# Patient Record
Sex: MALE | Race: ASIAN | NOT HISPANIC OR LATINO
[De-identification: names, ages, dates, MRNs, and addresses within clinical notes are randomized per-mention and may not be internally consistent; named-entity substitution may affect disease eponyms.]

---

## 2023-12-29 VITALS
DIASTOLIC BLOOD PRESSURE: 63 MMHG | OXYGEN SATURATION: 96 % | HEART RATE: 65 BPM | RESPIRATION RATE: 16 BRPM | SYSTOLIC BLOOD PRESSURE: 104 MMHG | HEIGHT: 63 IN | WEIGHT: 130.07 LBS | TEMPERATURE: 97 F

## 2023-12-29 RX ORDER — SODIUM CHLORIDE 9 MG/ML
500 INJECTION INTRAMUSCULAR; INTRAVENOUS; SUBCUTANEOUS
Refills: 0 | Status: DISCONTINUED | OUTPATIENT
Start: 2024-01-08 | End: 2024-01-09

## 2023-12-29 RX ORDER — CHLORHEXIDINE GLUCONATE 213 G/1000ML
1 SOLUTION TOPICAL ONCE
Refills: 0 | Status: DISCONTINUED | OUTPATIENT
Start: 2024-01-08 | End: 2024-01-09

## 2023-12-29 RX ORDER — SIMVASTATIN 20 MG/1
1 TABLET, FILM COATED ORAL
Refills: 0 | DISCHARGE

## 2023-12-29 NOTE — H&P ADULT - ASSESSMENT
54 yo Mandarin speaking male with a PMH of HTN, HLD, DM2,  AFIB (previously on Eliquis, self stopped and started ASA 12/24 per patient) who presented to outpt cardiologist Dr. Reza (referred by PCP Dr. Peterson), for TURNER and cardiomegaly. Patient reports TURNER + SSCP upon walking on an incline. As per MD note, patient was found to have cardiomegaly on CXR. TTE 10/16/23:  normal LVSF EF 55-60%, mild concentric LV hypertrophy, LA mildly dilated, aortic root mildly dilated, no significant changes compared to TTE on 11/16/22. CCTA 12/6/23: Ca score 364, subtotal occlusion in dRCA, severe stenosis in prox + mid LCx and mRCA, mod-severe stenosis mLAD, remaining coronary segments non-obstructive, normal LV function EF 63%. In light of patient's risk factors, abnormal CCTA results, and CCS class III anginal/anginal-equivalent symptoms, patient is referred to [facility] for cardiac catheterization w/ possible intervention if clinically indicated.    -H/H = 13.5/38.5. Pt denies BRBPR, hematuria, hematochezia, melena. Discussed load with Dr. Reza given extensive CAD on CCTA. Per Dr. Reza, pt loaded 325mg ASA x1 and Plavix 600mg x1 pre cath.  #[id number] (Mandarin).   -Of note: Patient stated that after visit with Dr. Reza on 12/14 he stopped taking Eliquis and started the new ASA that was prescribed. He has been compliant with ASA and last dose x2d ago. Pt  made aware he should continue Eliquis and medications would be clarified post cath  -Mg 1.9 s/p 400mg Mag Ox x1  -Cr = 0.86. EF normal. Euvolemic on exam. IVF with 250cc bolus x1 followed by NS @ 75cc/hr started pre procedure per protocol  -Type of sedation: moderate  -Candidate for sedation: yes     Risks & benefits of procedure and alternative therapy have been explained to the patient including but not limited to: allergic reaction, bleeding w/possible need for blood transfusion, infection, renal and vascular compromise, limb damage, arrhythmia, stroke, vessel dissection/perforation, Myocardial infarction, emergent CABG. Informed consent obtained and in chart.  52 yo Mandarin speaking male with a PMH of HTN, HLD, DM2,  AFIB (previously on Eliquis, self stopped and started ASA 12/24 per patient) who presented to outpt cardiologist Dr. Reza (referred by PCP Dr. Peterson), for TURNER and cardiomegaly. Patient reports TURNER + SSCP upon walking on an incline. As per MD note, patient was found to have cardiomegaly on CXR. TTE 10/16/23:  normal LVSF EF 55-60%, mild concentric LV hypertrophy, LA mildly dilated, aortic root mildly dilated, no significant changes compared to TTE on 11/16/22. CCTA 12/6/23: Ca score 364, subtotal occlusion in dRCA, severe stenosis in prox + mid LCx and mRCA, mod-severe stenosis mLAD, remaining coronary segments non-obstructive, normal LV function EF 63%. In light of patient's risk factors, abnormal CCTA results, and CCS class III anginal/anginal-equivalent symptoms, patient is referred to [facility] for cardiac catheterization w/ possible intervention if clinically indicated.    -H/H = 13.5/38.5. Pt denies BRBPR, hematuria, hematochezia, melena. Discussed load with Dr. Reza given extensive CAD on CCTA. Per Dr. Reza, pt loaded 325mg ASA x1 and Plavix 600mg x1 pre cath.  #[id number] (Mandarin).   -Of note: Patient stated that after visit with Dr. Reza on 12/14 he stopped taking Eliquis and started the new ASA that was prescribed. He has been compliant with ASA and last dose x2d ago. Pt  made aware he should continue Eliquis and medications would be clarified post cath  -Mg 1.9 s/p 400mg Mag Ox x1  -Cr = 0.86. EF normal. Euvolemic on exam. IVF with 250cc bolus x1 followed by NS @ 75cc/hr started pre procedure per protocol  -Type of sedation: moderate  -Candidate for sedation: yes     Risks & benefits of procedure and alternative therapy have been explained to the patient including but not limited to: allergic reaction, bleeding w/possible need for blood transfusion, infection, renal and vascular compromise, limb damage, arrhythmia, stroke, vessel dissection/perforation, Myocardial infarction, emergent CABG. Informed consent obtained and in chart.

## 2023-12-29 NOTE — H&P ADULT - HISTORY OF PRESENT ILLNESS
Cards: Dr. Reza   Pharm:   Escort:       **CCTA SHOWS POSSIBLE 3 vessel-- discussed with Fellow about DAPT****     53 YOF pmhx of HTN, HLD, DM2,  AFIB (on Eliquis last dose______), hyperthyroidism presents to outpt cardiologist Dr. Reza (referred by PCP Dr. Peterson), for TURNER and cardiomegaly. Patient reports TURNER + SSCP upon walking on an incline. As per MD note, patient was found to have cardiomegaly on CXR. Patient denies______ palpitations, dizziness, LOC, N/V/D, fever/chills/sick contact, diaphoresis, orthopnea/PND, and leg swelling.    TTE 10/16/23:  normal LVSF EF 55-60%, mild concentric LV hypertrophy, LA mildly dilated, aortic root mildly dilated, no significant changes compared to TTE on 11/16/22.   CCTA 12/6/23: Ca score 364, subtotal occlusion in dRCA, severe stenosis in prox + mid LCx and mRCA, mod-severe stenosis mLAD, remaining coronary segments non-obstructive, normal LV function EF 63%.     In light of patient's risk factors, abnormal CCTA results, and CCS class III anginal/anginal-equivalent symptoms, patient is referred to [facility] for cardiac catheterization w/ possible intervention if clinically indicated.       Cards: Dr. Reza   Pharm:   Escort:       **CCTA SHOWS POSSIBLE 3 vessel-- discussed with Fellow about DAPT****     53 YOF pmhx of HTN, HLD, DM2,  AFIB (on Eliquis last dose______), hyperthyroidism presents to outpt cardiologist Dr. Reza (referred by PCP Dr. Peterson), for TURNER and cardiomegaly. Patient reports TURNER + SSCP upon walking on an incline. As per MD note, patient was found to have cardiomegaly on CXR. Patient denies______ palpitations, dizziness, LOC, N/V/D, fever/chills/sick contact, diaphoresis, orthopnea/PND, and leg swelling.    TTE 10/16/23:  normal LVSF EF 55-60%, mild concentric LV hypertrophy, LA mildly dilated, aortic root mildly dilated, no significant changes compared to TTE on 11/16/22.   CCTA 12/6/23: Ca score 364, subtotal occlusion in dRCA, severe stenosis in prox + mid LCx and mRCA, mod-severe stenosis mLAD, remaining coronary segments non-obstructive, normal LV function EF 63%.     In light of patient's risk factors, abnormal CCTA results, and CCS class III anginal/anginal-equivalent symptoms, patient is referred to [facility] for cardiac catheterization w/ possible intervention if clinically indicated.       Cards: Dr. Reza   Pharm:   Escort:       **CCTA SHOWS POSSIBLE 3 vessel-- discussed with Fellow about DAPT****     52 YO Mandarin speaking male with pmhx of HTN, HLD, DM2,  AFIB (on Eliquis last dose______), hyperthyroidism presents to outpt cardiologist Dr. Reza (referred by PCP Dr. Peterson), for TURNER and cardiomegaly. Patient reports TURNER + SSCP upon walking on an incline. As per MD note, patient was found to have cardiomegaly on CXR. Patient denies______ palpitations, dizziness, LOC, N/V/D, fever/chills/sick contact, diaphoresis, orthopnea/PND, and leg swelling.    TTE 10/16/23:  normal LVSF EF 55-60%, mild concentric LV hypertrophy, LA mildly dilated, aortic root mildly dilated, no significant changes compared to TTE on 11/16/22.   CCTA 12/6/23: Ca score 364, subtotal occlusion in dRCA, severe stenosis in prox + mid LCx and mRCA, mod-severe stenosis mLAD, remaining coronary segments non-obstructive, normal LV function EF 63%.     In light of patient's risk factors, abnormal CCTA results, and CCS class III anginal/anginal-equivalent symptoms, patient is referred to [facility] for cardiac catheterization w/ possible intervention if clinically indicated.       Cards: Dr. Reza   Pharm:   Escort:       **CCTA SHOWS POSSIBLE 3 vessel-- discussed with Fellow about DAPT****     52 YO Mandarin speaking male with pmhx of HTN, HLD, DM2,  AFIB (on Eliquis last dose______), hyperthyroidism presents to outpt cardiologist Dr. Reza (referred by PCP Dr. Peterson), for TURNER and cardiomegaly. Patient reports TURNER + SSCP upon walking on an incline. As per MD note, patient was found to have cardiomegaly on CXR. Patient denies______ palpitations, dizziness, LOC, N/V/D, fever/chills/sick contact, diaphoresis, orthopnea/PND, and leg swelling.    TTE 10/16/23:  normal LVSF EF 55-60%, mild concentric LV hypertrophy, LA mildly dilated, aortic root mildly dilated, no significant changes compared to TTE on 11/16/22.   CCTA 12/6/23: Ca score 364, subtotal occlusion in dRCA, severe stenosis in prox + mid LCx and mRCA, mod-severe stenosis mLAD, remaining coronary segments non-obstructive, normal LV function EF 63%.     In light of patient's risk factors, abnormal CCTA results, and CCS class III anginal/anginal-equivalent symptoms, patient is referred to [facility] for cardiac catheterization w/ possible intervention if clinically indicated.       Cards: Dr. Reza   Pharm:   Escort:       **CCTA SHOWS POSSIBLE 3 vessel-- discuss with Fellow about DAPT****     54 YO Mandarin speaking male with pmhx of HTN, HLD, DM2,  AFIB (on Eliquis last dose______), hyperthyroidism presents to outpt cardiologist Dr. Reza (referred by PCP Dr. Peterson), for TURNER and cardiomegaly. Patient reports TURNER + SSCP upon walking on an incline. As per MD note, patient was found to have cardiomegaly on CXR. Patient denies______ palpitations, dizziness, LOC, N/V/D, fever/chills/sick contact, diaphoresis, orthopnea/PND, and leg swelling.    TTE 10/16/23:  normal LVSF EF 55-60%, mild concentric LV hypertrophy, LA mildly dilated, aortic root mildly dilated, no significant changes compared to TTE on 11/16/22.   CCTA 12/6/23: Ca score 364, subtotal occlusion in dRCA, severe stenosis in prox + mid LCx and mRCA, mod-severe stenosis mLAD, remaining coronary segments non-obstructive, normal LV function EF 63%.     In light of patient's risk factors, abnormal CCTA results, and CCS class III anginal/anginal-equivalent symptoms, patient is referred to [facility] for cardiac catheterization w/ possible intervention if clinically indicated.       Cards: Dr. Reza   Pharm:   Escort:       **CCTA SHOWS POSSIBLE 3 vessel-- discuss with Fellow about DAPT****     54 YO Mandarin speaking male with pmhx of HTN, HLD, DM2,  AFIB (on Eliquis last dose______), hyperthyroidism presents to outpt cardiologist Dr. Reza (referred by PCP Dr. Peterson), for TURNER and cardiomegaly. Patient reports TURNER + SSCP upon walking on an incline. As per MD note, patient was found to have cardiomegaly on CXR. Patient denies______ palpitations, dizziness, LOC, N/V/D, fever/chills/sick contact, diaphoresis, orthopnea/PND, and leg swelling.    TTE 10/16/23:  normal LVSF EF 55-60%, mild concentric LV hypertrophy, LA mildly dilated, aortic root mildly dilated, no significant changes compared to TTE on 11/16/22.   CCTA 12/6/23: Ca score 364, subtotal occlusion in dRCA, severe stenosis in prox + mid LCx and mRCA, mod-severe stenosis mLAD, remaining coronary segments non-obstructive, normal LV function EF 63%.     In light of patient's risk factors, abnormal CCTA results, and CCS class III anginal/anginal-equivalent symptoms, patient is referred to [facility] for cardiac catheterization w/ possible intervention if clinically indicated.       Cards: Dr. Reza   Pharm: [facility]  Escort: Wife    54 yo Mandarin speaking male with a PMH of HTN, HLD, DM2,  AFIB (previously on Eliquis, self stopped and started ASA 12/24 per patient) who presented to outpt cardiologist Dr. Reza (referred by PCP Dr. Peterson), for TURNER and cardiomegaly. Patient reports TURNER + SSCP upon walking on an incline. As per MD note, patient was found to have cardiomegaly on CXR. Patient denies palpitations, dizziness, LOC, N/V/D, diaphoresis, orthopnea/PND, and leg swelling. TTE 10/16/23:  normal LVSF EF 55-60%, mild concentric LV hypertrophy, LA mildly dilated, aortic root mildly dilated, no significant changes compared to TTE on 11/16/22. CCTA 12/6/23: Ca score 364, subtotal occlusion in dRCA, severe stenosis in prox + mid LCx and mRCA, mod-severe stenosis mLAD, remaining coronary segments non-obstructive, normal LV function EF 63%. In light of patient's risk factors, abnormal CCTA results, and CCS class III anginal/anginal-equivalent symptoms, patient is referred to [facility] for cardiac catheterization w/ possible intervention if clinically indicated.       Cards: Dr. Reza   Pharm: [email or web address]  Escort: Wife    54 yo Mandarin speaking male with a PMH of HTN, HLD, DM2,  AFIB (previously on Eliquis, self stopped and started ASA 12/24 per patient) who presented to outpt cardiologist Dr. Reza (referred by PCP Dr. Peterson), for TURNER and cardiomegaly. Patient reports TURNER + SSCP upon walking on an incline. As per MD note, patient was found to have cardiomegaly on CXR. Patient denies palpitations, dizziness, LOC, N/V/D, diaphoresis, orthopnea/PND, and leg swelling. TTE 10/16/23:  normal LVSF EF 55-60%, mild concentric LV hypertrophy, LA mildly dilated, aortic root mildly dilated, no significant changes compared to TTE on 11/16/22. CCTA 12/6/23: Ca score 364, subtotal occlusion in dRCA, severe stenosis in prox + mid LCx and mRCA, mod-severe stenosis mLAD, remaining coronary segments non-obstructive, normal LV function EF 63%. In light of patient's risk factors, abnormal CCTA results, and CCS class III anginal/anginal-equivalent symptoms, patient is referred to [facility] for cardiac catheterization w/ possible intervention if clinically indicated.

## 2023-12-29 NOTE — H&P ADULT - NSICDXPASTMEDICALHX_GEN_ALL_CORE_FT
PAST MEDICAL HISTORY:  HLD (hyperlipidemia)     HTN (hypertension)     Hyperthyroidism     Type 2 diabetes mellitus      PAST MEDICAL HISTORY:  HLD (hyperlipidemia)     HTN (hypertension)     Type 2 diabetes mellitus

## 2024-01-08 ENCOUNTER — TRANSCRIPTION ENCOUNTER (OUTPATIENT)
Age: 54
End: 2024-01-08

## 2024-01-08 ENCOUNTER — INPATIENT (INPATIENT)
Facility: HOSPITAL | Age: 54
LOS: 0 days | Discharge: HOME | DRG: 322 | End: 2024-01-09
Attending: INTERNAL MEDICINE | Admitting: INTERNAL MEDICINE
Payer: COMMERCIAL

## 2024-01-08 DIAGNOSIS — Z87.19 PERSONAL HISTORY OF OTHER DISEASES OF THE DIGESTIVE SYSTEM: Chronic | ICD-10-CM

## 2024-01-08 LAB
A1C WITH ESTIMATED AVERAGE GLUCOSE RESULT: 9 % — HIGH (ref 4–5.6)
A1C WITH ESTIMATED AVERAGE GLUCOSE RESULT: 9 % — HIGH (ref 4–5.6)
ALBUMIN SERPL ELPH-MCNC: 3.9 G/DL — SIGNIFICANT CHANGE UP (ref 3.3–5)
ALBUMIN SERPL ELPH-MCNC: 3.9 G/DL — SIGNIFICANT CHANGE UP (ref 3.3–5)
ALP SERPL-CCNC: 81 U/L — SIGNIFICANT CHANGE UP (ref 40–120)
ALP SERPL-CCNC: 81 U/L — SIGNIFICANT CHANGE UP (ref 40–120)
ALT FLD-CCNC: 48 U/L — HIGH (ref 10–45)
ALT FLD-CCNC: 48 U/L — HIGH (ref 10–45)
ANION GAP SERPL CALC-SCNC: 6 MMOL/L — SIGNIFICANT CHANGE UP (ref 5–17)
ANION GAP SERPL CALC-SCNC: 6 MMOL/L — SIGNIFICANT CHANGE UP (ref 5–17)
APTT BLD: 29.3 SEC — SIGNIFICANT CHANGE UP (ref 24.5–35.6)
APTT BLD: 29.3 SEC — SIGNIFICANT CHANGE UP (ref 24.5–35.6)
AST SERPL-CCNC: 36 U/L — SIGNIFICANT CHANGE UP (ref 10–40)
AST SERPL-CCNC: 36 U/L — SIGNIFICANT CHANGE UP (ref 10–40)
BASOPHILS # BLD AUTO: 0.03 K/UL — SIGNIFICANT CHANGE UP (ref 0–0.2)
BASOPHILS # BLD AUTO: 0.03 K/UL — SIGNIFICANT CHANGE UP (ref 0–0.2)
BASOPHILS NFR BLD AUTO: 0.6 % — SIGNIFICANT CHANGE UP (ref 0–2)
BASOPHILS NFR BLD AUTO: 0.6 % — SIGNIFICANT CHANGE UP (ref 0–2)
BILIRUB SERPL-MCNC: 1.2 MG/DL — SIGNIFICANT CHANGE UP (ref 0.2–1.2)
BILIRUB SERPL-MCNC: 1.2 MG/DL — SIGNIFICANT CHANGE UP (ref 0.2–1.2)
BUN SERPL-MCNC: 26 MG/DL — HIGH (ref 7–23)
BUN SERPL-MCNC: 26 MG/DL — HIGH (ref 7–23)
CALCIUM SERPL-MCNC: 9 MG/DL — SIGNIFICANT CHANGE UP (ref 8.4–10.5)
CALCIUM SERPL-MCNC: 9 MG/DL — SIGNIFICANT CHANGE UP (ref 8.4–10.5)
CHLORIDE SERPL-SCNC: 111 MMOL/L — HIGH (ref 96–108)
CHLORIDE SERPL-SCNC: 111 MMOL/L — HIGH (ref 96–108)
CHOLEST SERPL-MCNC: 101 MG/DL — SIGNIFICANT CHANGE UP
CHOLEST SERPL-MCNC: 101 MG/DL — SIGNIFICANT CHANGE UP
CK MB CFR SERPL CALC: 1.9 NG/ML — SIGNIFICANT CHANGE UP (ref 0–6.7)
CK MB CFR SERPL CALC: 1.9 NG/ML — SIGNIFICANT CHANGE UP (ref 0–6.7)
CK SERPL-CCNC: 94 U/L — SIGNIFICANT CHANGE UP (ref 30–200)
CK SERPL-CCNC: 94 U/L — SIGNIFICANT CHANGE UP (ref 30–200)
CO2 SERPL-SCNC: 23 MMOL/L — SIGNIFICANT CHANGE UP (ref 22–31)
CO2 SERPL-SCNC: 23 MMOL/L — SIGNIFICANT CHANGE UP (ref 22–31)
CREAT SERPL-MCNC: 0.86 MG/DL — SIGNIFICANT CHANGE UP (ref 0.5–1.3)
CREAT SERPL-MCNC: 0.86 MG/DL — SIGNIFICANT CHANGE UP (ref 0.5–1.3)
EGFR: 104 ML/MIN/1.73M2 — SIGNIFICANT CHANGE UP
EGFR: 104 ML/MIN/1.73M2 — SIGNIFICANT CHANGE UP
EOSINOPHIL # BLD AUTO: 0.11 K/UL — SIGNIFICANT CHANGE UP (ref 0–0.5)
EOSINOPHIL # BLD AUTO: 0.11 K/UL — SIGNIFICANT CHANGE UP (ref 0–0.5)
EOSINOPHIL NFR BLD AUTO: 2.1 % — SIGNIFICANT CHANGE UP (ref 0–6)
EOSINOPHIL NFR BLD AUTO: 2.1 % — SIGNIFICANT CHANGE UP (ref 0–6)
ESTIMATED AVERAGE GLUCOSE: 212 MG/DL — HIGH (ref 68–114)
ESTIMATED AVERAGE GLUCOSE: 212 MG/DL — HIGH (ref 68–114)
GLUCOSE BLDC GLUCOMTR-MCNC: 109 MG/DL — HIGH (ref 70–99)
GLUCOSE BLDC GLUCOMTR-MCNC: 109 MG/DL — HIGH (ref 70–99)
GLUCOSE BLDC GLUCOMTR-MCNC: 128 MG/DL — HIGH (ref 70–99)
GLUCOSE BLDC GLUCOMTR-MCNC: 128 MG/DL — HIGH (ref 70–99)
GLUCOSE BLDC GLUCOMTR-MCNC: 218 MG/DL — HIGH (ref 70–99)
GLUCOSE BLDC GLUCOMTR-MCNC: 218 MG/DL — HIGH (ref 70–99)
GLUCOSE BLDC GLUCOMTR-MCNC: 262 MG/DL — HIGH (ref 70–99)
GLUCOSE BLDC GLUCOMTR-MCNC: 262 MG/DL — HIGH (ref 70–99)
GLUCOSE SERPL-MCNC: 168 MG/DL — HIGH (ref 70–99)
GLUCOSE SERPL-MCNC: 168 MG/DL — HIGH (ref 70–99)
HCT VFR BLD CALC: 38.5 % — LOW (ref 39–50)
HCT VFR BLD CALC: 38.5 % — LOW (ref 39–50)
HDLC SERPL-MCNC: 43 MG/DL — SIGNIFICANT CHANGE UP
HDLC SERPL-MCNC: 43 MG/DL — SIGNIFICANT CHANGE UP
HGB BLD-MCNC: 13.5 G/DL — SIGNIFICANT CHANGE UP (ref 13–17)
HGB BLD-MCNC: 13.5 G/DL — SIGNIFICANT CHANGE UP (ref 13–17)
IMM GRANULOCYTES NFR BLD AUTO: 0.4 % — SIGNIFICANT CHANGE UP (ref 0–0.9)
IMM GRANULOCYTES NFR BLD AUTO: 0.4 % — SIGNIFICANT CHANGE UP (ref 0–0.9)
INR BLD: 0.92 — SIGNIFICANT CHANGE UP (ref 0.85–1.18)
INR BLD: 0.92 — SIGNIFICANT CHANGE UP (ref 0.85–1.18)
ISTAT ACTK (ACTIVATED CLOTTING TIME KAOLIN): 244 SEC — HIGH (ref 74–137)
ISTAT ACTK (ACTIVATED CLOTTING TIME KAOLIN): 244 SEC — HIGH (ref 74–137)
ISTAT ACTK (ACTIVATED CLOTTING TIME KAOLIN): 277 SEC — HIGH (ref 74–137)
ISTAT ACTK (ACTIVATED CLOTTING TIME KAOLIN): 277 SEC — HIGH (ref 74–137)
ISTAT ACTK (ACTIVATED CLOTTING TIME KAOLIN): 287 SEC — HIGH (ref 74–137)
ISTAT ACTK (ACTIVATED CLOTTING TIME KAOLIN): 287 SEC — HIGH (ref 74–137)
ISTAT ACTK (ACTIVATED CLOTTING TIME KAOLIN): 390 SEC — HIGH (ref 74–137)
ISTAT ACTK (ACTIVATED CLOTTING TIME KAOLIN): 390 SEC — HIGH (ref 74–137)
LIPID PNL WITH DIRECT LDL SERPL: 50 MG/DL — SIGNIFICANT CHANGE UP
LIPID PNL WITH DIRECT LDL SERPL: 50 MG/DL — SIGNIFICANT CHANGE UP
LYMPHOCYTES # BLD AUTO: 1.72 K/UL — SIGNIFICANT CHANGE UP (ref 1–3.3)
LYMPHOCYTES # BLD AUTO: 1.72 K/UL — SIGNIFICANT CHANGE UP (ref 1–3.3)
LYMPHOCYTES # BLD AUTO: 32.4 % — SIGNIFICANT CHANGE UP (ref 13–44)
LYMPHOCYTES # BLD AUTO: 32.4 % — SIGNIFICANT CHANGE UP (ref 13–44)
MAGNESIUM SERPL-MCNC: 1.9 MG/DL — SIGNIFICANT CHANGE UP (ref 1.6–2.6)
MAGNESIUM SERPL-MCNC: 1.9 MG/DL — SIGNIFICANT CHANGE UP (ref 1.6–2.6)
MCHC RBC-ENTMCNC: 31 PG — SIGNIFICANT CHANGE UP (ref 27–34)
MCHC RBC-ENTMCNC: 31 PG — SIGNIFICANT CHANGE UP (ref 27–34)
MCHC RBC-ENTMCNC: 35.1 GM/DL — SIGNIFICANT CHANGE UP (ref 32–36)
MCHC RBC-ENTMCNC: 35.1 GM/DL — SIGNIFICANT CHANGE UP (ref 32–36)
MCV RBC AUTO: 88.3 FL — SIGNIFICANT CHANGE UP (ref 80–100)
MCV RBC AUTO: 88.3 FL — SIGNIFICANT CHANGE UP (ref 80–100)
MONOCYTES # BLD AUTO: 0.42 K/UL — SIGNIFICANT CHANGE UP (ref 0–0.9)
MONOCYTES # BLD AUTO: 0.42 K/UL — SIGNIFICANT CHANGE UP (ref 0–0.9)
MONOCYTES NFR BLD AUTO: 7.9 % — SIGNIFICANT CHANGE UP (ref 2–14)
MONOCYTES NFR BLD AUTO: 7.9 % — SIGNIFICANT CHANGE UP (ref 2–14)
NEUTROPHILS # BLD AUTO: 3.01 K/UL — SIGNIFICANT CHANGE UP (ref 1.8–7.4)
NEUTROPHILS # BLD AUTO: 3.01 K/UL — SIGNIFICANT CHANGE UP (ref 1.8–7.4)
NEUTROPHILS NFR BLD AUTO: 56.6 % — SIGNIFICANT CHANGE UP (ref 43–77)
NEUTROPHILS NFR BLD AUTO: 56.6 % — SIGNIFICANT CHANGE UP (ref 43–77)
NON HDL CHOLESTEROL: 58 MG/DL — SIGNIFICANT CHANGE UP
NON HDL CHOLESTEROL: 58 MG/DL — SIGNIFICANT CHANGE UP
NRBC # BLD: 0 /100 WBCS — SIGNIFICANT CHANGE UP (ref 0–0)
NRBC # BLD: 0 /100 WBCS — SIGNIFICANT CHANGE UP (ref 0–0)
PLATELET # BLD AUTO: 133 K/UL — LOW (ref 150–400)
PLATELET # BLD AUTO: 133 K/UL — LOW (ref 150–400)
POTASSIUM SERPL-MCNC: 4.1 MMOL/L — SIGNIFICANT CHANGE UP (ref 3.5–5.3)
POTASSIUM SERPL-MCNC: 4.1 MMOL/L — SIGNIFICANT CHANGE UP (ref 3.5–5.3)
POTASSIUM SERPL-SCNC: 4.1 MMOL/L — SIGNIFICANT CHANGE UP (ref 3.5–5.3)
POTASSIUM SERPL-SCNC: 4.1 MMOL/L — SIGNIFICANT CHANGE UP (ref 3.5–5.3)
PROT SERPL-MCNC: 5.6 G/DL — LOW (ref 6–8.3)
PROT SERPL-MCNC: 5.6 G/DL — LOW (ref 6–8.3)
PROTHROM AB SERPL-ACNC: 10.5 SEC — SIGNIFICANT CHANGE UP (ref 9.5–13)
PROTHROM AB SERPL-ACNC: 10.5 SEC — SIGNIFICANT CHANGE UP (ref 9.5–13)
RBC # BLD: 4.36 M/UL — SIGNIFICANT CHANGE UP (ref 4.2–5.8)
RBC # BLD: 4.36 M/UL — SIGNIFICANT CHANGE UP (ref 4.2–5.8)
RBC # FLD: 12.4 % — SIGNIFICANT CHANGE UP (ref 10.3–14.5)
RBC # FLD: 12.4 % — SIGNIFICANT CHANGE UP (ref 10.3–14.5)
SODIUM SERPL-SCNC: 140 MMOL/L — SIGNIFICANT CHANGE UP (ref 135–145)
SODIUM SERPL-SCNC: 140 MMOL/L — SIGNIFICANT CHANGE UP (ref 135–145)
TRIGL SERPL-MCNC: 39 MG/DL — SIGNIFICANT CHANGE UP
TRIGL SERPL-MCNC: 39 MG/DL — SIGNIFICANT CHANGE UP
WBC # BLD: 5.31 K/UL — SIGNIFICANT CHANGE UP (ref 3.8–10.5)
WBC # BLD: 5.31 K/UL — SIGNIFICANT CHANGE UP (ref 3.8–10.5)
WBC # FLD AUTO: 5.31 K/UL — SIGNIFICANT CHANGE UP (ref 3.8–10.5)
WBC # FLD AUTO: 5.31 K/UL — SIGNIFICANT CHANGE UP (ref 3.8–10.5)

## 2024-01-08 PROCEDURE — 93010 ELECTROCARDIOGRAM REPORT: CPT

## 2024-01-08 RX ORDER — ASPIRIN/CALCIUM CARB/MAGNESIUM 324 MG
81 TABLET ORAL DAILY
Refills: 0 | Status: DISCONTINUED | OUTPATIENT
Start: 2024-01-09 | End: 2024-01-09

## 2024-01-08 RX ORDER — DEXTROSE 50 % IN WATER 50 %
25 SYRINGE (ML) INTRAVENOUS ONCE
Refills: 0 | Status: DISCONTINUED | OUTPATIENT
Start: 2024-01-08 | End: 2024-01-09

## 2024-01-08 RX ORDER — APIXABAN 2.5 MG/1
1 TABLET, FILM COATED ORAL
Refills: 0 | DISCHARGE

## 2024-01-08 RX ORDER — CLOPIDOGREL BISULFATE 75 MG/1
75 TABLET, FILM COATED ORAL DAILY
Refills: 0 | Status: DISCONTINUED | OUTPATIENT
Start: 2024-01-09 | End: 2024-01-09

## 2024-01-08 RX ORDER — GLUCAGON INJECTION, SOLUTION 0.5 MG/.1ML
1 INJECTION, SOLUTION SUBCUTANEOUS ONCE
Refills: 0 | Status: DISCONTINUED | OUTPATIENT
Start: 2024-01-08 | End: 2024-01-09

## 2024-01-08 RX ORDER — METOPROLOL TARTRATE 50 MG
25 TABLET ORAL DAILY
Refills: 0 | Status: DISCONTINUED | OUTPATIENT
Start: 2024-01-08 | End: 2024-01-09

## 2024-01-08 RX ORDER — ASPIRIN/CALCIUM CARB/MAGNESIUM 324 MG
325 TABLET ORAL DAILY
Refills: 0 | Status: DISCONTINUED | OUTPATIENT
Start: 2024-01-08 | End: 2024-01-08

## 2024-01-08 RX ORDER — SODIUM CHLORIDE 9 MG/ML
1000 INJECTION, SOLUTION INTRAVENOUS
Refills: 0 | Status: DISCONTINUED | OUTPATIENT
Start: 2024-01-08 | End: 2024-01-09

## 2024-01-08 RX ORDER — DEXTROSE 50 % IN WATER 50 %
12.5 SYRINGE (ML) INTRAVENOUS ONCE
Refills: 0 | Status: DISCONTINUED | OUTPATIENT
Start: 2024-01-08 | End: 2024-01-09

## 2024-01-08 RX ORDER — CLOPIDOGREL BISULFATE 75 MG/1
600 TABLET, FILM COATED ORAL ONCE
Refills: 0 | Status: DISCONTINUED | OUTPATIENT
Start: 2024-01-08 | End: 2024-01-08

## 2024-01-08 RX ORDER — INSULIN LISPRO 100/ML
VIAL (ML) SUBCUTANEOUS
Refills: 0 | Status: DISCONTINUED | OUTPATIENT
Start: 2024-01-08 | End: 2024-01-09

## 2024-01-08 RX ORDER — SODIUM CHLORIDE 9 MG/ML
500 INJECTION INTRAMUSCULAR; INTRAVENOUS; SUBCUTANEOUS
Refills: 0 | Status: DISCONTINUED | OUTPATIENT
Start: 2024-01-08 | End: 2024-01-09

## 2024-01-08 RX ORDER — SODIUM CHLORIDE 9 MG/ML
250 INJECTION INTRAMUSCULAR; INTRAVENOUS; SUBCUTANEOUS ONCE
Refills: 0 | Status: COMPLETED | OUTPATIENT
Start: 2024-01-08 | End: 2024-01-08

## 2024-01-08 RX ORDER — APIXABAN 2.5 MG/1
1 TABLET, FILM COATED ORAL
Qty: 60 | Refills: 0
Start: 2024-01-08 | End: 2024-02-06

## 2024-01-08 RX ORDER — CLOPIDOGREL BISULFATE 75 MG/1
600 TABLET, FILM COATED ORAL ONCE
Refills: 0 | Status: COMPLETED | OUTPATIENT
Start: 2024-01-08 | End: 2024-01-08

## 2024-01-08 RX ORDER — DEXTROSE 50 % IN WATER 50 %
15 SYRINGE (ML) INTRAVENOUS ONCE
Refills: 0 | Status: DISCONTINUED | OUTPATIENT
Start: 2024-01-08 | End: 2024-01-09

## 2024-01-08 RX ORDER — ATORVASTATIN CALCIUM 80 MG/1
40 TABLET, FILM COATED ORAL AT BEDTIME
Refills: 0 | Status: DISCONTINUED | OUTPATIENT
Start: 2024-01-08 | End: 2024-01-09

## 2024-01-08 RX ORDER — LISINOPRIL 2.5 MG/1
10 TABLET ORAL DAILY
Refills: 0 | Status: DISCONTINUED | OUTPATIENT
Start: 2024-01-09 | End: 2024-01-09

## 2024-01-08 RX ORDER — MAGNESIUM OXIDE 400 MG ORAL TABLET 241.3 MG
400 TABLET ORAL ONCE
Refills: 0 | Status: COMPLETED | OUTPATIENT
Start: 2024-01-08 | End: 2024-01-08

## 2024-01-08 RX ADMIN — CLOPIDOGREL BISULFATE 600 MILLIGRAM(S): 75 TABLET, FILM COATED ORAL at 08:40

## 2024-01-08 RX ADMIN — Medication 4: at 22:28

## 2024-01-08 RX ADMIN — Medication 25 MILLIGRAM(S): at 16:19

## 2024-01-08 RX ADMIN — SODIUM CHLORIDE 75 MILLILITER(S): 9 INJECTION INTRAMUSCULAR; INTRAVENOUS; SUBCUTANEOUS at 08:40

## 2024-01-08 RX ADMIN — ATORVASTATIN CALCIUM 40 MILLIGRAM(S): 80 TABLET, FILM COATED ORAL at 21:29

## 2024-01-08 RX ADMIN — SODIUM CHLORIDE 150 MILLILITER(S): 9 INJECTION INTRAMUSCULAR; INTRAVENOUS; SUBCUTANEOUS at 14:56

## 2024-01-08 RX ADMIN — Medication: at 13:30

## 2024-01-08 RX ADMIN — Medication 325 MILLIGRAM(S): at 08:40

## 2024-01-08 RX ADMIN — SODIUM CHLORIDE 500 MILLILITER(S): 9 INJECTION INTRAMUSCULAR; INTRAVENOUS; SUBCUTANEOUS at 08:40

## 2024-01-08 RX ADMIN — MAGNESIUM OXIDE 400 MG ORAL TABLET 400 MILLIGRAM(S): 241.3 TABLET ORAL at 08:40

## 2024-01-08 NOTE — DISCHARGE NOTE PROVIDER - NSDCCPCAREPLAN_GEN_ALL_CORE_FT
PRINCIPAL DISCHARGE DIAGNOSIS  Diagnosis: CAD (coronary artery disease)  Assessment and Plan of Treatment: You were found to have blockages in the arteries of your heart, also known as Coronary Artery Disease. You underwent a cardiac catheterization on 1/8/24 and received three stents from the proximal to distal right coronary artery.   PLEASE CONTINUE ASPIRIN 81MG DAILY AND PLAVIX 75MG DAILY. AFTER ONE WEEK, STOP TAKING ASPIRIN ONLY. DO NOT STOP THESE MEDICATIONS FOR ANY REASON AS THEY ARE KEEPING YOUR STENT OPEN AND PREVENTING A HEART ATTACK.   Avoid strenuous activity or heavy lifting anything more than 5lbs for the next five days. Do not take a bath or swim for the next five days; you may shower. For any bleeding or hematoma formation (hardened blood collection under the skin) at the access site of your right wrist and right groin please hold pressure and go to the emergency room. Please follow up with Dr. Reza in 1-2 weeks. For recurrent chest pain, please call your doctor or go to the emergency room.   You are to return for another cardiac catheterization for residual disease in approximately 4-6 weeks; please follow up with your cardiologist to schedule this.      SECONDARY DISCHARGE DIAGNOSES  Diagnosis: Atrial fibrillation  Assessment and Plan of Treatment: You have an abnormal heart rhythm (arrhythmia) called atrial fibrillation. With this condition, the hearts 2 upper chambers (the atria) quiver rather than squeeze the blood out in a normal pattern. This leads to an irregular and sometimes rapid heartbeat. Atrial fibrillation is serious condition as it affects the heart’s ability to fill with blood as it should and blood clots may form, which increases the risk for stroke.  -Please CONTINUE  ELIQUIS 5MG TWICE A DAY to prevent a stroke.  -Please CONTINUE Metoprolol succinate 25 mg daily to keep your heart rate regular  -Please follow up with Dr. Reza.    Diagnosis: HTN (hypertension)  Assessment and Plan of Treatment: Please continue Lisinopril 10 mg daily and Metoprolol succinate 25 mg daily as listed to keep your blood pressure controlled. For blood pressure that is too high or too low please see your doctor or go to the emergency room as necessary.    Diagnosis: HLD (hyperlipidemia)  Assessment and Plan of Treatment: Please continue atorvastatin 40 mg at bedtime to keep your cholesterol low. High cholesterol contributes to heart disease.    Diagnosis: Type 2 diabetes mellitus  Assessment and Plan of Treatment: Your Hemoglobin A1c is 9.0% and your goal A1c is less than 7.0%. This number measures your average blood sugar level over the last three months.  Please continue Metformin 500 mg daily as listed for diabetes. Maintain a low carbohydrate, low sugar diet, exercise, monitor your fingerstick blood sugars regarly and follow up with your Endocrinologist/Primary Care Doctor.     PRINCIPAL DISCHARGE DIAGNOSIS  Diagnosis: CAD (coronary artery disease)  Assessment and Plan of Treatment: You were found to have blockages in the arteries of your heart, also known as Coronary Artery Disease. You underwent a cardiac catheterization on 1/8/24 and received three stents from the proximal to distal right coronary artery.   PLEASE CONTINUE ASPIRIN 81MG DAILY AND PLAVIX 75MG DAILY. AFTER ONE WEEK, STOP TAKING ASPIRIN ONLY. DO NOT STOP THESE MEDICATIONS FOR ANY REASON AS THEY ARE KEEPING YOUR STENT OPEN AND PREVENTING A HEART ATTACK.   Avoid strenuous activity or heavy lifting anything more than 5lbs for the next five days. Do not take a bath or swim for the next five days; you may shower. For any bleeding or hematoma formation (hardened blood collection under the skin) at the access site of your right wrist and right groin please hold pressure and go to the emergency room. Please follow up with Dr. Reza in 1-2 weeks. For recurrent chest pain, please call your doctor or go to the emergency room.   You are to return for another cardiac catheterization for residual disease in approximately 4-6 weeks; please follow up with your cardiologist to schedule this.      SECONDARY DISCHARGE DIAGNOSES  Diagnosis: HTN (hypertension)  Assessment and Plan of Treatment: Please continue Lisinopril 10 mg daily and Metoprolol succinate 25 mg daily as listed to keep your blood pressure controlled. For blood pressure that is too high or too low please see your doctor or go to the emergency room as necessary.    Diagnosis: HLD (hyperlipidemia)  Assessment and Plan of Treatment: Please continue atorvastatin 40 mg at bedtime to keep your cholesterol low. High cholesterol contributes to heart disease.    Diagnosis: Type 2 diabetes mellitus  Assessment and Plan of Treatment: Your Hemoglobin A1c is 9.0% and your goal A1c is less than 7.0%. This number measures your average blood sugar level over the last three months.  Please continue Metformin 500 mg daily as listed for diabetes. Maintain a low carbohydrate, low sugar diet, exercise, monitor your fingerstick blood sugars regarly and follow up with your Endocrinologist/Primary Care Doctor.    Diagnosis: Atrial fibrillation  Assessment and Plan of Treatment: You have an abnormal heart rhythm (arrhythmia) called atrial fibrillation. With this condition, the hearts 2 upper chambers (the atria) quiver rather than squeeze the blood out in a normal pattern. This leads to an irregular and sometimes rapid heartbeat. Atrial fibrillation is serious condition as it affects the heart’s ability to fill with blood as it should and blood clots may form, which increases the risk for stroke.  -Please CONTINUE  ELIQUIS 5MG TWICE A DAY to prevent a stroke.  -Please CONTINUE Metoprolol succinate 25 mg daily to keep your heart rate regular  -Please follow up with Dr. Reza.

## 2024-01-08 NOTE — DISCHARGE NOTE PROVIDER - HOSPITAL COURSE
****INCOMPLETE****    53-year-old Mandarin-speaking male with a PMHx of HTN, HLD, T2DM,  AFIB (previously on Eliquis, self stopped and started ASA 12/24 per patient) who presented to [facility] for recommended cardiac cath with possible intervention if clinically indicated in light of patient's risk factors, CCS Class III anginal/anginal-equivalent symptoms, and abnormal CCTA results.     s/p cardiac cath 1/8/24: ATIF X 3 RCA , return in 5 weeks for staged PCI of residual LAD and Lcx disease, EDP 8, EF nl by ECHO, R Rad @ 3:30 pm, R groin PC    IC/Fellow: Juaquin/Eros  statin: Lipitor 40 (home dose)    AFIB (previously on Eliquis, self stopped and started ASA 12/24 per patient)-     As per Dr. Reza plan to start Eliquis 5 mg BID 1/9/24 if both access site stable (need to be ordered in am), plan for TPT X 1 week and then d/c ASA    - Eliquis sent to [facility], verify co-pay.       Pt now s/p cardiac catheterization (1/8/24): DESx3 RCA , EDP 8, Access: right radial/right groin PC.     PATIENT TO RETURN FOR STAGED INTERVENTION OF RESIDUAL LAD AND LCX DISEASE.     Pt admitted overnight for observation and telemetry monitoring. Pt seen and examined at bedside this AM without any complaints or events overnight, VSS, labs and telemetry reviewed and pt stable for discharge as discussed with  ___. Pt has received appropriate discharge instructions, including medication regimen, access site management and follow up with Dr. Reza in 1-2 weeks.    Discharge medications: ASA 81mg QD, Plavix 75mg QD, Eliquis 5 mg QD, Atorvastatin 40 mg QHS, Lipitor 40 mg QD, Lisinopril 10 mg QD, Toprol XL 25 mg QD, Metformin 500 mg QD    TRIPLE THERAPY:________________REGIMEN____     Cardiac Rehab (Post PCI): Education on benefits of Cardiac Rehab provided to patient: Yes, Referral and Prescription Given for Cardiac Rehab: Yes, Pt given list of locations & instructed to contact their insurance company to review list of participating providers. ****INCOMPLETE****    53-year-old Mandarin-speaking male with a PMHx of HTN, HLD, T2DM,  AFIB (previously on Eliquis, self stopped and started ASA 12/24 per patient) who presented to [facility] for recommended cardiac cath with possible intervention if clinically indicated in light of patient's risk factors, CCS Class III anginal/anginal-equivalent symptoms, and abnormal CCTA results.     s/p cardiac cath 1/8/24: ATIF X 3 RCA , return in 5 weeks for staged PCI of residual LAD and Lcx disease, EDP 8, EF nl by ECHO, R Rad @ 3:30 pm, R groin PC    IC/Fellow: Juaquin/Eros  statin: Lipitor 40 (home dose)    AFIB (previously on Eliquis, self stopped and started ASA 12/24 per patient)-     As per Dr. Reza plan to start Eliquis 5 mg BID 1/9/24 if both access site stable (need to be ordered in am), plan for TPT X 1 week and then d/c ASA    - Eliquis sent to [facility], verify co-pay.       Pt now s/p cardiac catheterization (1/8/24): DESx3 RCA , EDP 8, Access: right radial/right groin PC.     PATIENT TO RETURN FOR STAGED INTERVENTION OF RESIDUAL LAD AND LCX DISEASE.     Pt admitted overnight for observation and telemetry monitoring. Pt seen and examined at bedside this AM without any complaints or events overnight, VSS, labs and telemetry reviewed and pt stable for discharge as discussed with  ___. Pt has received appropriate discharge instructions, including medication regimen, access site management and follow up with Dr. Reza in 1-2 weeks.    Discharge medications: ASA 81mg QD, Plavix 75mg QD, Eliquis 5 mg QD, Atorvastatin 40 mg QHS, Lipitor 40 mg QD, Lisinopril 10 mg QD, Toprol XL 25 mg QD, Metformin 500 mg QD    TRIPLE THERAPY:________________REGIMEN____     Cardiac Rehab (Post PCI): Education on benefits of Cardiac Rehab provided to patient: Yes, Referral and Prescription Given for Cardiac Rehab: Yes, Pt given list of locations & instructed to contact their insurance company to review list of participating providers. ****INCOMPLETE****    53-year-old Mandarin-speaking male with a PMHx of HTN, HLD, T2DM,  AFIB (previously on Eliquis, self stopped and started ASA 12/24 per patient) who presented to [facility] for recommended cardiac cath with possible intervention if clinically indicated in light of patient's risk factors, CCS Class III anginal/anginal-equivalent symptoms, and abnormal CCTA results.     Pt now s/p cardiac catheterization (1/8/24): DESx3 distal to proximal RCA , EDP 8, Access: right radial/right groin PC.     PATIENT TO RETURN FOR STAGED INTERVENTION OF RESIDUAL LAD AND LCX DISEASE.     Pt admitted overnight for observation and telemetry monitoring. Pt seen and examined at bedside this AM without any complaints or events overnight, VSS, labs and telemetry reviewed and pt stable for discharge as discussed with  ___. Pt has received appropriate discharge instructions, including medication regimen, access site management and follow up with Dr. Reza in 1-2 weeks.    Discharge medications: ASA 81mg QD (DISCONTINUE AFTER 7 DAYS), Plavix 75mg QD, Eliquis 5 mg QD, Atorvastatin 40 mg QHS, Lipitor 40 mg QD, Lisinopril 10 mg QD, Toprol XL 25 mg QD, Metformin 500 mg QD    Cardiac Rehab (Post PCI): Education on benefits of Cardiac Rehab provided to patient: No, patient is referred for staged cardiac catheterization. GLP-1 receptor agonist/SGLT2 inhibitor meds discussed w/ patients and encouraged to discuss further with PMD or Endocrinologist at next visit. Pt discharge copies detail cardiovascular history, medications, testing/treatments, OR patient has created a patient portal account and instructed to provide their records at their 1st appointment.     ****INCOMPLETE****    53-year-old Mandarin-speaking male with a PMHx of HTN, HLD, T2DM,  AFIB (previously on Eliquis, self stopped and started ASA 12/24 per patient) who presented to [facility] for recommended cardiac cath with possible intervention if clinically indicated in light of patient's risk factors, CCS Class III anginal/anginal-equivalent symptoms, and abnormal CCTA results.     Pt now s/p cardiac catheterization (1/8/24): DESx3 distal to proximal RCA , EDP 8, Access: right radial/right groin PC.     PATIENT TO RETURN FOR STAGED INTERVENTION OF RESIDUAL LAD AND LCX DISEASE.     Pt admitted overnight for observation and telemetry monitoring. Pt seen and examined at bedside this AM without any complaints or events overnight, VSS, labs and telemetry reviewed and pt stable for discharge as discussed with  ___. Pt has received appropriate discharge instructions, including medication regimen, access site management and follow up with Dr. Reza in 1-2 weeks.    Discharge medications: ASA 81mg QD (DISCONTINUE AFTER 7 DAYS), Plavix 75mg QD, Eliquis 5 mg QD, Atorvastatin 40 mg QHS, Lipitor 40 mg QD, Lisinopril 10 mg QD, Toprol XL 25 mg QD, Metformin 500 mg QD    Cardiac Rehab (Post PCI): Education on benefits of Cardiac Rehab provided to patient: No, patient is referred for staged cardiac catheterization. GLP-1 receptor agonist/SGLT2 inhibitor meds discussed w/ patients and encouraged to discuss further with PMD or Endocrinologist at next visit. Pt discharge copies detail cardiovascular history, medications, testing/treatments, OR patient has created a patient portal account and instructed to provide their records at their 1st appointment.         52 y/o Mandarin-speaking male with a PMHx of HTN, HLD, T2DM,  AFIB (previously on Eliquis, self stopped and started ASA 12/24 per patient) who presented to [facility] for recommended cardiac cath with possible intervention if clinically indicated in light of patient's risk factors, CCS Class III anginal/anginal-equivalent symptoms, and abnormal CCTA results.  Pt now s/p cardiac catheterization (1/8/24): DESx3 distal to proximal RCA , EDP 8, Access: right radial/right groin PC.  PATIENT TO RETURN FOR STAGED INTERVENTION OF RESIDUAL LAD AND LCX DISEASE.       Pt admitted overnight for observation and telemetry monitoring. Pt seen and examined at bedside this AM without any complaints or events overnight, R groin access site stable, no bleeding and hematoma noted, R distal pulse intact,  +VSS, labs and telemetry reviewed and pt stable for discharge as discussed with Dr. Mosquera. Pt has received appropriate discharge instructions, including medication regimen, access site management and follow up with Dr. Reza in 1-2 weeks. Prescriptions have been e-prescribed to patient's preferred pharmacy    Discharge medications: ASA 81mg QD (DISCONTINUE AFTER 7 DAYS), Plavix 75mg QD, Eliquis 5 mg QD (educated on importance of compliance of med), Atorvastatin 40 mg QHS, Lipitor 40 mg QD, Lisinopril 10 mg QD, Toprol XL 25 mg QD, Metformin 500 mg QD    Cardiac Rehab (Post PCI): Education on benefits of Cardiac Rehab provided to patient: No, patient is referred for staged cardiac catheterization. GLP-1 receptor agonist/SGLT2 inhibitor meds discussed w/ patients and encouraged to discuss further with PMD or Endocrinologist at next visit. Pt discharge copies detail cardiovascular history, medications, testing/treatments, OR patient has created a patient portal account and instructed to provide their records at their 1st appointment.         54 y/o Mandarin-speaking male with a PMHx of HTN, HLD, T2DM,  AFIB (previously on Eliquis, self stopped and started ASA 12/24 per patient) who presented to [facility] for recommended cardiac cath with possible intervention if clinically indicated in light of patient's risk factors, CCS Class III anginal/anginal-equivalent symptoms, and abnormal CCTA results.  Pt now s/p cardiac catheterization (1/8/24): DESx3 distal to proximal RCA , EDP 8, Access: right radial/right groin PC.  PATIENT TO RETURN FOR STAGED INTERVENTION OF RESIDUAL LAD AND LCX DISEASE.       Pt admitted overnight for observation and telemetry monitoring. Pt seen and examined at bedside this AM without any complaints or events overnight, R groin access site stable, no bleeding and hematoma noted, R distal pulse intact,  +VSS, labs and telemetry reviewed and pt stable for discharge as discussed with Dr. Mosquera. Pt has received appropriate discharge instructions, including medication regimen, access site management and follow up with Dr. Reza in 1-2 weeks. Prescriptions have been e-prescribed to patient's preferred pharmacy    Discharge medications: ASA 81mg QD (DISCONTINUE AFTER 7 DAYS), Plavix 75mg QD, Eliquis 5 mg QD (educated on importance of compliance of med), Atorvastatin 40 mg QHS, Lipitor 40 mg QD, Lisinopril 10 mg QD, Toprol XL 25 mg QD, Metformin 500 mg QD    Cardiac Rehab (Post PCI): Education on benefits of Cardiac Rehab provided to patient: No, patient is referred for staged cardiac catheterization. GLP-1 receptor agonist/SGLT2 inhibitor meds discussed w/ patients and encouraged to discuss further with PMD or Endocrinologist at next visit. Pt discharge copies detail cardiovascular history, medications, testing/treatments, OR patient has created a patient portal account and instructed to provide their records at their 1st appointment.         52 y/o Mandarin-speaking male with a PMHx of HTN, HLD, T2DM,  AFIB (previously on Eliquis, self stopped and started ASA 12/24 per patient) who presented to [facility] for recommended cardiac cath with possible intervention if clinically indicated in light of patient's risk factors, CCS Class III anginal/anginal-equivalent symptoms, and abnormal CCTA results.  Pt now s/p cardiac catheterization (1/8/24): DESx3 distal to proximal RCA , EDP 8, Access: right radial/right groin PC.  PATIENT TO RETURN FOR STAGED INTERVENTION OF RESIDUAL LAD AND LCX DISEASE.       Pt admitted overnight for observation and telemetry monitoring. Pt seen and examined at bedside this AM without any complaints or events overnight, R groin access site stable, no bleeding and hematoma noted, R distal pulse intact,  +VSS, labs and telemetry reviewed and pt stable for discharge as discussed with Dr. Mosquera. Pt has received appropriate discharge instructions, including medication regimen, access site management and follow up with Dr. Reza in 1-2 weeks. Prescriptions have been e-prescribed to patient's preferred pharmacy    Discharge medications: ASA 81mg QD (DISCONTINUE AFTER 7 DAYS), Plavix 75mg QD, Eliquis 5 mg QD (educated on importance of compliance of med- Eliquis copay is $ 9 as verified with pharmacy), Atorvastatin 40 mg QHS, Lipitor 40 mg QD, Lisinopril 10 mg QD, Toprol XL 25 mg QD, Metformin 500 mg QD    Cardiac Rehab (Post PCI): Education on benefits of Cardiac Rehab provided to patient: No, patient is referred for staged cardiac catheterization. GLP-1 receptor agonist/SGLT2 inhibitor meds discussed w/ patients and encouraged to discuss further with PMD or Endocrinologist at next visit. Pt discharge copies detail cardiovascular history, medications, testing/treatments, OR patient has created a patient portal account and instructed to provide their records at their 1st appointment.         52 y/o Mandarin-speaking male with a PMHx of HTN, HLD, T2DM,  AFIB (previously on Eliquis, self stopped and started ASA 12/24 per patient) who presented to [facility] for recommended cardiac cath with possible intervention if clinically indicated in light of patient's risk factors, CCS Class III anginal/anginal-equivalent symptoms, and abnormal CCTA results.  Pt now s/p cardiac catheterization (1/8/24): DESx3 distal to proximal RCA , EDP 8, Access: right radial/right groin PC.  PATIENT TO RETURN FOR STAGED INTERVENTION OF RESIDUAL LAD AND LCX DISEASE.       Pt admitted overnight for observation and telemetry monitoring. Pt seen and examined at bedside this AM without any complaints or events overnight, R groin access site stable, no bleeding and hematoma noted, R distal pulse intact,  +VSS, labs and telemetry reviewed and pt stable for discharge as discussed with Dr. Mosquera. Pt has received appropriate discharge instructions, including medication regimen, access site management and follow up with Dr. Reza in 1-2 weeks. Prescriptions have been e-prescribed to patient's preferred pharmacy    Discharge medications: ASA 81mg QD (DISCONTINUE AFTER 7 DAYS), Plavix 75mg QD, Eliquis 5 mg QD (educated on importance of compliance of med- Eliquis copay is $ 9 as verified with pharmacy), Atorvastatin 40 mg QHS, Lipitor 40 mg QD, Lisinopril 10 mg QD, Toprol XL 25 mg QD, Metformin 500 mg QD    Cardiac Rehab (Post PCI): Education on benefits of Cardiac Rehab provided to patient: No, patient is referred for staged cardiac catheterization. GLP-1 receptor agonist/SGLT2 inhibitor meds discussed w/ patients and encouraged to discuss further with PMD or Endocrinologist at next visit. Pt discharge copies detail cardiovascular history, medications, testing/treatments, OR patient has created a patient portal account and instructed to provide their records at their 1st appointment.

## 2024-01-08 NOTE — CONSULT NOTE ADULT - ASSESSMENT
Successful PCI of RCA  with ATIF x 3 with excellent angiographic and IVUS result.    Plan:  1. Continue ASA/Plavix for one week then Plavix indefinitely.  2. Will restart Eliquis 5 mg po bid on 1/9/2024 for PAF if access sites stable.  3. Will continue high intensity statin.  4. Follow up with Dr. Reza in office on 1/11/2024 for medication review and access site assessment.    5. PCU observation over night.

## 2024-01-08 NOTE — DISCHARGE NOTE PROVIDER - CARE PROVIDERS DIRECT ADDRESSES
,[email or web address] ,[email or web address]

## 2024-01-08 NOTE — DISCHARGE NOTE PROVIDER - PROVIDER TOKENS
PROVIDER:[TOKEN:[[id number]],FOLLOWUP:[1 week],ESTABLISHEDPATIENT:[T]] PROVIDER:[TOKEN:[[id number]],FOLLOWUP:[1 week],ESTABLISHEDPATIENT:[T]]

## 2024-01-08 NOTE — DISCHARGE NOTE PROVIDER - NSDCMRMEDTOKEN_GEN_ALL_CORE_FT
aspirin 81 mg oral delayed release tablet: 1 tab(s) orally once a day  Eliquis 5 mg oral tablet: 1 tab(s) orally 2 times a day  Lipitor 40 mg oral tablet: 1 tab(s) orally once a day  lisinopril 10 mg oral tablet: 1 tab(s) orally once a day  metFORMIN 500 mg oral tablet, extended release: 1 tab(s) orally once a day  metoprolol succinate 25 mg oral tablet, extended release: 1 tab(s) orally once a day   aspirin 81 mg oral delayed release tablet: 1 tab(s) orally once a day  clopidogrel 75 mg oral tablet: 1 tab(s) orally once a day  Eliquis 5 mg oral tablet: 1 tab(s) orally 2 times a day  Lipitor 40 mg oral tablet: 1 tab(s) orally once a day  lisinopril 10 mg oral tablet: 1 tab(s) orally once a day  metFORMIN 500 mg oral tablet, extended release: 1 tab(s) orally once a day  metoprolol succinate 25 mg oral tablet, extended release: 1 tab(s) orally once a day

## 2024-01-08 NOTE — PATIENT PROFILE ADULT - FUNCTIONAL ASSESSMENT - BASIC MOBILITY 6.
3-calculated by average/Not able to assess (calculate score using [facility] averaging method)  3-calculated by average/Not able to assess (calculate score using [facility] averaging method)

## 2024-01-08 NOTE — PATIENT PROFILE ADULT - FALL HARM RISK - HARM RISK INTERVENTIONS
Assistance with ambulation/Assistance OOB with selected safe patient handling equipment/Communicate Risk of Fall with Harm to all staff/Monitor gait and stability/Reinforce activity limits and safety measures with patient and family/Sit up slowly, dangle for a short time, stand at bedside before walking/Tailored Fall Risk Interventions/Use of alarms - bed, chair and/or voice tab/Visual Cue: Yellow wristband and red socks/Bed in lowest position, wheels locked, appropriate side rails in place/Call bell, personal items and telephone in reach/Instruct patient to call for assistance before getting out of bed or chair/Non-slip footwear when patient is out of bed/[place] to call system/Physically safe environment - no spills, clutter or unnecessary equipment/Purposeful Proactive Rounding/Room/bathroom lighting operational, light cord in reach Assistance with ambulation/Assistance OOB with selected safe patient handling equipment/Communicate Risk of Fall with Harm to all staff/Monitor gait and stability/Reinforce activity limits and safety measures with patient and family/Sit up slowly, dangle for a short time, stand at bedside before walking/Tailored Fall Risk Interventions/Use of alarms - bed, chair and/or voice tab/Visual Cue: Yellow wristband and red socks/Bed in lowest position, wheels locked, appropriate side rails in place/Call bell, personal items and telephone in reach/Instruct patient to call for assistance before getting out of bed or chair/Non-slip footwear when patient is out of bed/[place] to call system/Physically safe environment - no spills, clutter or unnecessary equipment/Purposeful Proactive Rounding/Room/bathroom lighting operational, light cord in reach

## 2024-01-08 NOTE — CONSULT NOTE ADULT - SUBJECTIVE AND OBJECTIVE BOX
The patient developed new onset angina over the past 2 months with CCTA showing severe multivessel CAD including subtotal RCA stenosis.  Treatment options including medical therapy vs PCI vs CABG discussed with patient in office in detail.  The patient opted for revascularization, specifically preferring PCI over CABG.  The is preference was confirmed with patient at bedside prior to cardiac cath this morning.    During cath, he was found to have severe 3v CAD including functional  of dRCA with normal LVEF.  Decision was made to proceed with PCI of RCA and if successful staged PCI of LCX and FFR of LAD, with CABG as alternative revascularization strategy should RCA PCI fails.  We were ultimately able traverse RCA  with Fielder XT guidewire and placed 3 ATIF in distal to proximal RCA with excellent angiographic and IVUS result.  The patient was admitted to step down unit in stable condition.     The patient developed new onset angina over the past 2 months with CCTA showing severe multivessel CAD including subtotal RCA stenosis.  Treatment options including medical therapy vs PCI vs CABG discussed with patient in office in detail.  The patient opted for revascularization, specifically preferring PCI over CABG.  The is preference was confirmed with patient at bedside prior to cardiac cath this morning.    During cath, he was found to have severe 3v CAD including functional  of dRCA with normal LVEF.  Decision was made to proceed with PCI of RCA and if successful staged PCI of LCX and FFR of LAD, with CABG as alternative revascularization strategy should RCA PCI fails.  We were ultimately able traverse RCA  with Fielder XT guidewire and placed 3 TAIF in distal to proximal RCA with excellent angiographic and IVUS result.  The patient was admitted to step down unit in stable condition.

## 2024-01-08 NOTE — DISCHARGE NOTE PROVIDER - CARE PROVIDER_API CALL
Kwame Reza  Cardiology  [address] [place] [address], [address]  [place] [address]  Phone: [phone]  Fax: [phone]  Established Patient  Follow Up Time: 1 week   Kwame Reza  Cardiology  [address] [place] [address], [address]  [place] [address]  Phone: [phone]  Fax: [phone]  Established Patient  Follow Up Time: 1 week

## 2024-01-09 ENCOUNTER — NON-APPOINTMENT (OUTPATIENT)
Age: 54
End: 2024-01-09

## 2024-01-09 VITALS
DIASTOLIC BLOOD PRESSURE: 77 MMHG | RESPIRATION RATE: 17 BRPM | HEART RATE: 75 BPM | TEMPERATURE: 98 F | OXYGEN SATURATION: 93 % | SYSTOLIC BLOOD PRESSURE: 119 MMHG

## 2024-01-09 PROBLEM — Z00.00 ENCOUNTER FOR PREVENTIVE HEALTH EXAMINATION: Status: ACTIVE | Noted: 2024-01-09

## 2024-01-09 LAB
ANION GAP SERPL CALC-SCNC: 7 MMOL/L — SIGNIFICANT CHANGE UP (ref 5–17)
ANION GAP SERPL CALC-SCNC: 7 MMOL/L — SIGNIFICANT CHANGE UP (ref 5–17)
BUN SERPL-MCNC: 18 MG/DL — SIGNIFICANT CHANGE UP (ref 7–23)
BUN SERPL-MCNC: 18 MG/DL — SIGNIFICANT CHANGE UP (ref 7–23)
CALCIUM SERPL-MCNC: 8.9 MG/DL — SIGNIFICANT CHANGE UP (ref 8.4–10.5)
CALCIUM SERPL-MCNC: 8.9 MG/DL — SIGNIFICANT CHANGE UP (ref 8.4–10.5)
CHLORIDE SERPL-SCNC: 108 MMOL/L — SIGNIFICANT CHANGE UP (ref 96–108)
CHLORIDE SERPL-SCNC: 108 MMOL/L — SIGNIFICANT CHANGE UP (ref 96–108)
CO2 SERPL-SCNC: 26 MMOL/L — SIGNIFICANT CHANGE UP (ref 22–31)
CO2 SERPL-SCNC: 26 MMOL/L — SIGNIFICANT CHANGE UP (ref 22–31)
CREAT SERPL-MCNC: 1.09 MG/DL — SIGNIFICANT CHANGE UP (ref 0.5–1.3)
CREAT SERPL-MCNC: 1.09 MG/DL — SIGNIFICANT CHANGE UP (ref 0.5–1.3)
EGFR: 81 ML/MIN/1.73M2 — SIGNIFICANT CHANGE UP
EGFR: 81 ML/MIN/1.73M2 — SIGNIFICANT CHANGE UP
GLUCOSE BLDC GLUCOMTR-MCNC: 131 MG/DL — HIGH (ref 70–99)
GLUCOSE BLDC GLUCOMTR-MCNC: 131 MG/DL — HIGH (ref 70–99)
GLUCOSE BLDC GLUCOMTR-MCNC: 154 MG/DL — HIGH (ref 70–99)
GLUCOSE BLDC GLUCOMTR-MCNC: 154 MG/DL — HIGH (ref 70–99)
GLUCOSE SERPL-MCNC: 142 MG/DL — HIGH (ref 70–99)
GLUCOSE SERPL-MCNC: 142 MG/DL — HIGH (ref 70–99)
HCT VFR BLD CALC: 40.4 % — SIGNIFICANT CHANGE UP (ref 39–50)
HCT VFR BLD CALC: 40.4 % — SIGNIFICANT CHANGE UP (ref 39–50)
HGB BLD-MCNC: 14.2 G/DL — SIGNIFICANT CHANGE UP (ref 13–17)
HGB BLD-MCNC: 14.2 G/DL — SIGNIFICANT CHANGE UP (ref 13–17)
MAGNESIUM SERPL-MCNC: 2.1 MG/DL — SIGNIFICANT CHANGE UP (ref 1.6–2.6)
MAGNESIUM SERPL-MCNC: 2.1 MG/DL — SIGNIFICANT CHANGE UP (ref 1.6–2.6)
MCHC RBC-ENTMCNC: 31.2 PG — SIGNIFICANT CHANGE UP (ref 27–34)
MCHC RBC-ENTMCNC: 31.2 PG — SIGNIFICANT CHANGE UP (ref 27–34)
MCHC RBC-ENTMCNC: 35.1 GM/DL — SIGNIFICANT CHANGE UP (ref 32–36)
MCHC RBC-ENTMCNC: 35.1 GM/DL — SIGNIFICANT CHANGE UP (ref 32–36)
MCV RBC AUTO: 88.8 FL — SIGNIFICANT CHANGE UP (ref 80–100)
MCV RBC AUTO: 88.8 FL — SIGNIFICANT CHANGE UP (ref 80–100)
NRBC # BLD: 0 /100 WBCS — SIGNIFICANT CHANGE UP (ref 0–0)
NRBC # BLD: 0 /100 WBCS — SIGNIFICANT CHANGE UP (ref 0–0)
PLATELET # BLD AUTO: 135 K/UL — LOW (ref 150–400)
PLATELET # BLD AUTO: 135 K/UL — LOW (ref 150–400)
POTASSIUM SERPL-MCNC: 4.3 MMOL/L — SIGNIFICANT CHANGE UP (ref 3.5–5.3)
POTASSIUM SERPL-MCNC: 4.3 MMOL/L — SIGNIFICANT CHANGE UP (ref 3.5–5.3)
POTASSIUM SERPL-SCNC: 4.3 MMOL/L — SIGNIFICANT CHANGE UP (ref 3.5–5.3)
POTASSIUM SERPL-SCNC: 4.3 MMOL/L — SIGNIFICANT CHANGE UP (ref 3.5–5.3)
RBC # BLD: 4.55 M/UL — SIGNIFICANT CHANGE UP (ref 4.2–5.8)
RBC # BLD: 4.55 M/UL — SIGNIFICANT CHANGE UP (ref 4.2–5.8)
RBC # FLD: 12.5 % — SIGNIFICANT CHANGE UP (ref 10.3–14.5)
RBC # FLD: 12.5 % — SIGNIFICANT CHANGE UP (ref 10.3–14.5)
SODIUM SERPL-SCNC: 141 MMOL/L — SIGNIFICANT CHANGE UP (ref 135–145)
SODIUM SERPL-SCNC: 141 MMOL/L — SIGNIFICANT CHANGE UP (ref 135–145)
WBC # BLD: 6.89 K/UL — SIGNIFICANT CHANGE UP (ref 3.8–10.5)
WBC # BLD: 6.89 K/UL — SIGNIFICANT CHANGE UP (ref 3.8–10.5)
WBC # FLD AUTO: 6.89 K/UL — SIGNIFICANT CHANGE UP (ref 3.8–10.5)
WBC # FLD AUTO: 6.89 K/UL — SIGNIFICANT CHANGE UP (ref 3.8–10.5)

## 2024-01-09 PROCEDURE — C1874: CPT

## 2024-01-09 PROCEDURE — 83036 HEMOGLOBIN GLYCOSYLATED A1C: CPT

## 2024-01-09 PROCEDURE — 82550 ASSAY OF CK (CPK): CPT

## 2024-01-09 PROCEDURE — C1894: CPT

## 2024-01-09 PROCEDURE — 82962 GLUCOSE BLOOD TEST: CPT

## 2024-01-09 PROCEDURE — 85610 PROTHROMBIN TIME: CPT

## 2024-01-09 PROCEDURE — 80048 BASIC METABOLIC PNL TOTAL CA: CPT

## 2024-01-09 PROCEDURE — 80061 LIPID PANEL: CPT

## 2024-01-09 PROCEDURE — 82553 CREATINE MB FRACTION: CPT

## 2024-01-09 PROCEDURE — C1887: CPT

## 2024-01-09 PROCEDURE — 93010 ELECTROCARDIOGRAM REPORT: CPT

## 2024-01-09 PROCEDURE — 85730 THROMBOPLASTIN TIME PARTIAL: CPT

## 2024-01-09 PROCEDURE — 93005 ELECTROCARDIOGRAM TRACING: CPT

## 2024-01-09 PROCEDURE — 85347 COAGULATION TIME ACTIVATED: CPT

## 2024-01-09 PROCEDURE — C1760: CPT

## 2024-01-09 PROCEDURE — 85027 COMPLETE CBC AUTOMATED: CPT

## 2024-01-09 PROCEDURE — C1769: CPT

## 2024-01-09 PROCEDURE — 99239 HOSP IP/OBS DSCHRG MGMT >30: CPT

## 2024-01-09 PROCEDURE — 83735 ASSAY OF MAGNESIUM: CPT

## 2024-01-09 PROCEDURE — C1725: CPT

## 2024-01-09 PROCEDURE — C1753: CPT

## 2024-01-09 PROCEDURE — 36415 COLL VENOUS BLD VENIPUNCTURE: CPT

## 2024-01-09 PROCEDURE — 85025 COMPLETE CBC W/AUTO DIFF WBC: CPT

## 2024-01-09 PROCEDURE — 80053 COMPREHEN METABOLIC PANEL: CPT

## 2024-01-09 RX ORDER — APIXABAN 2.5 MG/1
1 TABLET, FILM COATED ORAL
Qty: 60 | Refills: 3
Start: 2024-01-09 | End: 2024-05-07

## 2024-01-09 RX ORDER — ASPIRIN/CALCIUM CARB/MAGNESIUM 324 MG
1 TABLET ORAL
Refills: 0 | DISCHARGE

## 2024-01-09 RX ORDER — APIXABAN 2.5 MG/1
5 TABLET, FILM COATED ORAL
Refills: 0 | Status: DISCONTINUED | OUTPATIENT
Start: 2024-01-09 | End: 2024-01-09

## 2024-01-09 RX ORDER — CLOPIDOGREL BISULFATE 75 MG/1
1 TABLET, FILM COATED ORAL
Qty: 30 | Refills: 11
Start: 2024-01-09 | End: 2025-01-02

## 2024-01-09 RX ORDER — ASPIRIN/CALCIUM CARB/MAGNESIUM 324 MG
1 TABLET ORAL
Qty: 7 | Refills: 0
Start: 2024-01-09 | End: 2024-01-15

## 2024-01-09 RX ADMIN — CLOPIDOGREL BISULFATE 75 MILLIGRAM(S): 75 TABLET, FILM COATED ORAL at 12:47

## 2024-01-09 RX ADMIN — Medication 25 MILLIGRAM(S): at 05:25

## 2024-01-09 RX ADMIN — Medication 81 MILLIGRAM(S): at 12:47

## 2024-01-09 RX ADMIN — Medication 2: at 12:47

## 2024-01-09 NOTE — DISCHARGE NOTE NURSING/CASE MANAGEMENT/SOCIAL WORK - NSPROMEDSRETURNEDYESNO_GEN_A_NUR
91% on RA started on Remdesivir and Dexamethasone. CXR consistent with COVID.  - Remdesivir x5 days  - Dexamethasone x10 days  - Supplemental O2  - incentive spirometer  - Lovenox DVT ppx 91% on RA started on Remdesivir and Dexamethasone. CXR consistent with COVID.  - Remdesivir x5 days  - Dexamethasone x10 days  - Supplemental O2  - incentive spirometer  - Lovenox DVT ppx.  -DDimer increased x 10 - will check dopplers, consider CTPE if worsening hypoxia or signs of PE pt has his own meds

## 2024-01-09 NOTE — DISCHARGE NOTE NURSING/CASE MANAGEMENT/SOCIAL WORK - PATIENT PORTAL LINK FT
We received a fax from pharmacy to let pt know that his meds is not covered through his plan they want to switch it to allopurinol.      Called pt to verify that he does not want the pharmacy to change his FEBUXOSTAT 80 mg to allopurinol. He will keep it as is and continue to pay for it        You can access the FollowMyHealth Patient Portal offered by [facility] by registering at the following website: [email or web address]. By joining [employer]’s FollowMyHealth portal, you will also be able to view your health information using other applications (apps) compatible with our system. You can access the FollowMyHealth Patient Portal offered by [facility] by registering at the following website: [email or web address]. By joining [employer]’s FollowMyHealth portal, you will also be able to view your health information using other applications (apps) compatible with our system.

## 2024-01-09 NOTE — DISCHARGE NOTE NURSING/CASE MANAGEMENT/SOCIAL WORK - NSDCPEFALRISK_GEN_ALL_CORE
For information on Fall & Injury Prevention, visit: [email or web address] OR  [email or web address] OR  https://www.cdc.gov/steadi/patient.html For information on Fall & Injury Prevention, visit: [email or web address] OR  [email or web address] OR  https://www.cdc.gov/steadi/patient.html

## 2024-01-11 DIAGNOSIS — E11.9 TYPE 2 DIABETES MELLITUS WITHOUT COMPLICATIONS: ICD-10-CM

## 2024-01-11 DIAGNOSIS — I25.118 ATHEROSCLEROTIC HEART DISEASE OF NATIVE CORONARY ARTERY WITH OTHER FORMS OF ANGINA PECTORIS: ICD-10-CM

## 2024-01-11 DIAGNOSIS — I10 ESSENTIAL (PRIMARY) HYPERTENSION: ICD-10-CM

## 2024-01-11 DIAGNOSIS — I48.91 UNSPECIFIED ATRIAL FIBRILLATION: ICD-10-CM

## 2024-01-11 DIAGNOSIS — Z79.84 LONG TERM (CURRENT) USE OF ORAL HYPOGLYCEMIC DRUGS: ICD-10-CM

## 2024-01-11 DIAGNOSIS — E78.5 HYPERLIPIDEMIA, UNSPECIFIED: ICD-10-CM

## 2024-01-11 DIAGNOSIS — Z79.82 LONG TERM (CURRENT) USE OF ASPIRIN: ICD-10-CM

## 2024-01-11 DIAGNOSIS — R06.00 DYSPNEA, UNSPECIFIED: ICD-10-CM

## 2024-01-11 DIAGNOSIS — I77.810 THORACIC AORTIC ECTASIA: ICD-10-CM

## 2024-01-11 DIAGNOSIS — I25.82 CHRONIC TOTAL OCCLUSION OF CORONARY ARTERY: ICD-10-CM

## 2024-02-23 PROBLEM — I10 ESSENTIAL (PRIMARY) HYPERTENSION: Chronic | Status: ACTIVE | Noted: 2023-12-29

## 2024-02-23 PROBLEM — E78.5 HYPERLIPIDEMIA, UNSPECIFIED: Chronic | Status: ACTIVE | Noted: 2023-12-29

## 2024-02-23 PROBLEM — E11.9 TYPE 2 DIABETES MELLITUS WITHOUT COMPLICATIONS: Chronic | Status: ACTIVE | Noted: 2023-12-29

## 2024-02-26 VITALS
OXYGEN SATURATION: 97 % | SYSTOLIC BLOOD PRESSURE: 126 MMHG | RESPIRATION RATE: 16 BRPM | HEART RATE: 61 BPM | DIASTOLIC BLOOD PRESSURE: 66 MMHG

## 2024-02-26 RX ORDER — ASPIRIN/CALCIUM CARB/MAGNESIUM 324 MG
325 TABLET ORAL ONCE
Refills: 0 | Status: DISCONTINUED | OUTPATIENT
Start: 2024-02-29 | End: 2024-03-14

## 2024-02-26 RX ORDER — SODIUM CHLORIDE 9 MG/ML
1000 INJECTION INTRAMUSCULAR; INTRAVENOUS; SUBCUTANEOUS
Refills: 0 | Status: DISCONTINUED | OUTPATIENT
Start: 2024-02-29 | End: 2024-03-14

## 2024-02-26 RX ORDER — CLOPIDOGREL BISULFATE 75 MG/1
75 TABLET, FILM COATED ORAL ONCE
Refills: 0 | Status: DISCONTINUED | OUTPATIENT
Start: 2024-02-29 | End: 2024-03-14

## 2024-02-26 RX ORDER — CHLORHEXIDINE GLUCONATE 213 G/1000ML
1 SOLUTION TOPICAL ONCE
Refills: 0 | Status: DISCONTINUED | OUTPATIENT
Start: 2024-02-29 | End: 2024-03-14

## 2024-02-26 NOTE — H&P ADULT - HISTORY OF PRESENT ILLNESS
Cardiologist: Dr. Reza  Pharmacy:   Escort:    53-year-old Mandarin-speaking male with a PMH of HTN, HLD, T2DM,  Atrial fibrillation (on Eliquis, last dose ____), CAD s/p PCI (1/2024) who presents for staged PCI. Patient followed up with outpatient cardiologist Dr. Reza reporting persistent intermittent substernal chest pressure on brisk ambulation or incline that is better than prior to cath. Patient notes persistent feeling of intermittent palpitations and is scheduled for an ablation in March 2024 with Dr. Leung. Patient reports ___ good adherence to DAPT since procedure. Patient ___ denies:      Cardiac cath (1/8/24): DESx3 p/m/dRCA , pLAD 30-50%, mLAD 60-70%, mLCx 60-70%, dLCx 90%. EDP 8, Access: right radial/right groin PC.   TTE (10/16/23):  normal LVSF EF 55-60%, mild concentric LV hypertrophy, LA mildly dilated, aortic root mildly dilated, no significant changes compared to TTE on 11/16/22.      In light of patient's risk factors, CCS Class III anginal symptoms, and residual CAD on prior cardiac cath, patient is referred to [facility] for cardiac catheterization with possible intervention if clinically indicated.  Cardiologist: Dr. Reza  Pharmacy:  [facility]  Escort:    53-year-old Mandarin-speaking male with a PMH of HTN, HLD, T2DM,  Atrial fibrillation (on Eliquis, last dose ____), CAD s/p PCI (1/2024) who presents for staged PCI. Patient followed up with outpatient cardiologist Dr. Reza reporting persistent intermittent substernal chest pressure on brisk ambulation or incline that is better than prior to cath. Patient notes persistent feeling of intermittent palpitations and is scheduled for an ablation in March 2024 with Dr. Leung. Patient reports good adherence to DAPT since procedure. Patient  denies N/V, dizziness, PND/orthopnea and syncope.       Cardiac cath (1/8/24): DESx3 p/m/dRCA , pLAD 30-50%, mLAD 60-70%, mLCx 60-70%, dLCx 90%. EDP 8, Access: right radial/right groin PC.   TTE (10/16/23):  normal LVSF EF 55-60%, mild concentric LV hypertrophy, LA mildly dilated, aortic root mildly dilated, no significant changes compared to TTE on 11/16/22.      In light of patient's risk factors, CCS Class III anginal symptoms, and residual CAD on prior cardiac cath, patient is referred to [facility] for cardiac catheterization with possible intervention if clinically indicated.  Cardiologist: Dr. Reza  [facility]  Escort: wife     Eliquis last day 2/28, Plavix last dose 2/26    53-year-old Mandarin-speaking male with a PMH of HTN, HLD, T2DM,  Atrial fibrillation (on Eliquis, last dose 2/28/24 ), CAD s/p PCI (1/2024) who presents for staged PCI. Patient followed up with outpatient cardiologist Dr. Reza reporting persistent intermittent substernal chest pressure on brisk ambulation or incline that is better than prior to cath. Patient notes persistent feeling of intermittent palpitations and is scheduled for an ablation in March 2024 with Dr. Leung. Patient reports good adherence to DAPT since procedure. Patient  denies N/V, dizziness, PND/orthopnea and syncope.       Cardiac cath (1/8/24): DESx3 p/m/dRCA , pLAD 30-50%, mLAD 60-70%, mLCx 60-70%, dLCx 90%. EDP 8, Access: right radial/right groin PC.   TTE (10/16/23):  normal LVSF EF 55-60%, mild concentric LV hypertrophy, LA mildly dilated, aortic root mildly dilated, no significant changes compared to TTE on 11/16/22.      In light of patient's risk factors, CCS Class III anginal symptoms, and residual CAD on prior cardiac cath, patient is referred to [facility] for cardiac catheterization with possible intervention if clinically indicated.  Cardiologist: Dr. Reza  Pharmacy:  [facility]  Escort: wife     Case cancelled 2/29 as pt took eliquis 2/28. To be rescheduled in 1 week as per Dr. Reza.   Eliquis last day 2/28, Plavix last dose 2/26    53-year-old Mandarin-speaking male with a PMH of HTN, HLD, T2DM,  Atrial fibrillation (on Eliquis, last dose 2/28/24 ), CAD s/p PCI (1/2024) who presents for staged PCI. Patient followed up with outpatient cardiologist Dr. Reza reporting persistent intermittent substernal chest pressure on brisk ambulation or incline that is better than prior to cath. Patient notes persistent feeling of intermittent palpitations and is scheduled for an ablation in March 2024 with Dr. Leung. Patient reports good adherence to DAPT since procedure. Patient  denies N/V, dizziness, PND/orthopnea and syncope.       Cardiac cath (1/8/24): DESx3 p/m/dRCA , pLAD 30-50%, mLAD 60-70%, mLCx 60-70%, dLCx 90%. EDP 8, Access: right radial/right groin PC.   TTE (10/16/23):  normal LVSF EF 55-60%, mild concentric LV hypertrophy, LA mildly dilated, aortic root mildly dilated, no significant changes compared to TTE on 11/16/22.      In light of patient's risk factors, CCS Class III anginal symptoms, and residual CAD on prior cardiac cath, patient is referred to [facility] for cardiac catheterization with possible intervention if clinically indicated.

## 2024-02-26 NOTE — H&P ADULT - NSHPLABSRESULTS_GEN_ALL_CORE
15.5   6.05  )-----------( 141      ( 29 Feb 2024 07:00 )             44.8   02-29    144  |  108  |  x   ----------------------------<  x   4.3   |  x   |  x     Mg     2.0     02-29    PT/INR - ( 29 Feb 2024 07:00 )   PT: 12.2 sec;   INR: 1.07          PTT - ( 29 Feb 2024 07:00 )  PTT:35.7 sec

## 2024-02-26 NOTE — H&P ADULT - ASSESSMENT
53-year-old Mandarin-speaking male with a PMH of HTN, HLD, T2DM,  Atrial fibrillation (on Eliquis, last dose ____), CAD s/p PCI (1/2024) who presents for staged PCI of known residual disease in recent cath.    H/H    . Pt denies bleeding, GI bleeding, hematemesis, hematuria, BRBPR or melena . Pt. compliant with home DAPT. Pt. given ASA 81 mg PO X 1 and Plavix 75 mg PO X 1 pre-cath.  Cr.    IV  cc bolus X 1 to be followed by NS @ 75cc/hr X 2 pre-cath.    ASA       Mallampati       Risks & benefits of procedure and alternative therapy have been explained to the patient including but not limited to: allergic reaction, bleeding w/possible need for blood transfusion, infection, renal and vascular compromise, limb damage, arrhythmia, stroke, vessel dissection/perforation, Myocardial infarction, emergent CABG. Informed consent obtained and in chart by IC fellow.      52 y/o Mandarin-speaking M w/ PMH of HTN, HLD, T2DM,  Atrial fibrillation (on Eliquis, last dose 2/28), CAD s/p PCI (1/2024) who presents for staged PCI of known residual disease in recent cath.    H/H  15/44  . Pt denies bleeding, GI bleeding, hematemesis, hematuria, BRBPR or melena . Pt. compliant with home Plavix/Eliquis; Got confused on what he was supposed to hold; so ge stopped taking Plavix on 2/26 and took Eliquis 2/28. Dr. Rzea aware; awaiting further recs.   Cr.   , euvolemic in exam,  IV  cc bolus X 1 to be followed by NS @ 75cc/hr X 2 pre-cath.    ASA  3     Mallampati 3  pre-cath consented w/ help of Mandarin [employer] ID # [id number]      Risks & benefits of procedure and alternative therapy have been explained to the patient including but not limited to: allergic reaction, bleeding w/possible need for blood transfusion, infection, renal and vascular compromise, limb damage, arrhythmia, stroke, vessel dissection/perforation, Myocardial infarction, emergent CABG. Informed consent obtained and in chart

## 2024-02-29 ENCOUNTER — OUTPATIENT (OUTPATIENT)
Dept: OUTPATIENT SERVICES | Facility: HOSPITAL | Age: 54
LOS: 1 days | End: 2024-02-29
Payer: COMMERCIAL

## 2024-02-29 DIAGNOSIS — Z87.19 PERSONAL HISTORY OF OTHER DISEASES OF THE DIGESTIVE SYSTEM: Chronic | ICD-10-CM

## 2024-02-29 DIAGNOSIS — Z98.890 OTHER SPECIFIED POSTPROCEDURAL STATES: Chronic | ICD-10-CM

## 2024-02-29 LAB
A1C WITH ESTIMATED AVERAGE GLUCOSE RESULT: 7.6 % — HIGH (ref 4–5.6)
ALBUMIN SERPL ELPH-MCNC: 4.2 G/DL — SIGNIFICANT CHANGE UP (ref 3.3–5)
ALP SERPL-CCNC: 100 U/L — SIGNIFICANT CHANGE UP (ref 40–120)
ALT FLD-CCNC: 64 U/L — HIGH (ref 10–45)
ANION GAP SERPL CALC-SCNC: 10 MMOL/L — SIGNIFICANT CHANGE UP (ref 5–17)
APTT BLD: 35.7 SEC — HIGH (ref 24.5–35.6)
AST SERPL-CCNC: 62 U/L — HIGH (ref 10–40)
BASE EXCESS BLDV CALC-SCNC: 3.8 MMOL/L — HIGH (ref -2–3)
BASOPHILS # BLD AUTO: 0.04 K/UL — SIGNIFICANT CHANGE UP (ref 0–0.2)
BASOPHILS NFR BLD AUTO: 0.7 % — SIGNIFICANT CHANGE UP (ref 0–2)
BILIRUB SERPL-MCNC: 1.4 MG/DL — HIGH (ref 0.2–1.2)
BLOOD GAS VENOUS - BLOOD UREA NITROGEN: 20 MG/DL — SIGNIFICANT CHANGE UP (ref 7–23)
BLOOD GAS VENOUS - CREATININE: 1 MG/DL — SIGNIFICANT CHANGE UP (ref 0.5–1.3)
BUN SERPL-MCNC: 22 MG/DL — SIGNIFICANT CHANGE UP (ref 7–23)
CA-I SERPL-SCNC: 1.29 MMOL/L — SIGNIFICANT CHANGE UP (ref 1.15–1.33)
CALCIUM SERPL-MCNC: 9.7 MG/DL — SIGNIFICANT CHANGE UP (ref 8.4–10.5)
CHLORIDE SERPL-SCNC: 108 MMOL/L — SIGNIFICANT CHANGE UP (ref 96–108)
CHOLEST SERPL-MCNC: 125 MG/DL — SIGNIFICANT CHANGE UP
CO2 BLDV-SCNC: 31.8 MMOL/L — HIGH (ref 22–26)
CO2 SERPL-SCNC: 26 MMOL/L — SIGNIFICANT CHANGE UP (ref 22–31)
COHGB MFR BLDV: 1.3 % — SIGNIFICANT CHANGE UP
CREAT SERPL-MCNC: 1.14 MG/DL — SIGNIFICANT CHANGE UP (ref 0.5–1.3)
EGFR: 77 ML/MIN/1.73M2 — SIGNIFICANT CHANGE UP
EOSINOPHIL # BLD AUTO: 0.09 K/UL — SIGNIFICANT CHANGE UP (ref 0–0.5)
EOSINOPHIL NFR BLD AUTO: 1.5 % — SIGNIFICANT CHANGE UP (ref 0–6)
ESTIMATED AVERAGE GLUCOSE: 171 MG/DL — HIGH (ref 68–114)
GAS PNL BLDV: 140 MMOL/L — SIGNIFICANT CHANGE UP (ref 136–145)
GLUCOSE BLDC GLUCOMTR-MCNC: 143 MG/DL — HIGH (ref 70–99)
GLUCOSE BLDV-MCNC: 158 MG/DL — HIGH (ref 70–99)
GLUCOSE SERPL-MCNC: 161 MG/DL — HIGH (ref 70–99)
HCO3 BLDV-SCNC: 30 MMOL/L — HIGH (ref 22–29)
HCT VFR BLD CALC: 44.8 % — SIGNIFICANT CHANGE UP (ref 39–50)
HCT VFR BLDA CALC: 42 % — SIGNIFICANT CHANGE UP
HCT VFR BLDA CALC: 46 % — SIGNIFICANT CHANGE UP
HDLC SERPL-MCNC: 54 MG/DL — SIGNIFICANT CHANGE UP
HGB BLD CALC-MCNC: 15.4 G/DL — SIGNIFICANT CHANGE UP (ref 12.6–17.4)
HGB BLD-MCNC: 15.5 G/DL — SIGNIFICANT CHANGE UP (ref 13–17)
IMM GRANULOCYTES NFR BLD AUTO: 0.2 % — SIGNIFICANT CHANGE UP (ref 0–0.9)
INR BLD: 1.07 — SIGNIFICANT CHANGE UP (ref 0.85–1.18)
ISTAT INR: 1.2 — HIGH (ref 0.88–1.16)
ISTAT PT: 14 SEC — HIGH (ref 10–12.9)
LIPID PNL WITH DIRECT LDL SERPL: 61 MG/DL — SIGNIFICANT CHANGE UP
LYMPHOCYTES # BLD AUTO: 2.22 K/UL — SIGNIFICANT CHANGE UP (ref 1–3.3)
LYMPHOCYTES # BLD AUTO: 36.7 % — SIGNIFICANT CHANGE UP (ref 13–44)
MAGNESIUM SERPL-MCNC: 2 MG/DL — SIGNIFICANT CHANGE UP (ref 1.6–2.6)
MCHC RBC-ENTMCNC: 31.6 PG — SIGNIFICANT CHANGE UP (ref 27–34)
MCHC RBC-ENTMCNC: 34.6 GM/DL — SIGNIFICANT CHANGE UP (ref 32–36)
MCV RBC AUTO: 91.4 FL — SIGNIFICANT CHANGE UP (ref 80–100)
METHGB MFR BLDV: 0.7 % — SIGNIFICANT CHANGE UP
MONOCYTES # BLD AUTO: 0.48 K/UL — SIGNIFICANT CHANGE UP (ref 0–0.9)
MONOCYTES NFR BLD AUTO: 7.9 % — SIGNIFICANT CHANGE UP (ref 2–14)
NEUTROPHILS # BLD AUTO: 3.21 K/UL — SIGNIFICANT CHANGE UP (ref 1.8–7.4)
NEUTROPHILS NFR BLD AUTO: 53 % — SIGNIFICANT CHANGE UP (ref 43–77)
NON HDL CHOLESTEROL: 71 MG/DL — SIGNIFICANT CHANGE UP
NRBC # BLD: 0 /100 WBCS — SIGNIFICANT CHANGE UP (ref 0–0)
PCO2 BLDV: 51 MMHG — SIGNIFICANT CHANGE UP (ref 42–55)
PH BLDV: 7.38 — SIGNIFICANT CHANGE UP (ref 7.32–7.43)
PLATELET # BLD AUTO: 141 K/UL — LOW (ref 150–400)
PO2 BLDV: 49 MMHG — HIGH (ref 25–45)
POTASSIUM BLDV-SCNC: 4.3 MMOL/L — SIGNIFICANT CHANGE UP (ref 3.5–5.1)
POTASSIUM SERPL-MCNC: 4.3 MMOL/L — SIGNIFICANT CHANGE UP (ref 3.5–5.3)
POTASSIUM SERPL-SCNC: 4.3 MMOL/L — SIGNIFICANT CHANGE UP (ref 3.5–5.3)
PROT SERPL-MCNC: 6.4 G/DL — SIGNIFICANT CHANGE UP (ref 6–8.3)
PROTHROM AB SERPL-ACNC: 12.2 SEC — SIGNIFICANT CHANGE UP (ref 9.5–13)
RBC # BLD: 4.9 M/UL — SIGNIFICANT CHANGE UP (ref 4.2–5.8)
RBC # FLD: 11.6 % — SIGNIFICANT CHANGE UP (ref 10.3–14.5)
SAO2 % BLDV: 81 % — SIGNIFICANT CHANGE UP (ref 67–88)
SODIUM SERPL-SCNC: 144 MMOL/L — SIGNIFICANT CHANGE UP (ref 135–145)
TRIGL SERPL-MCNC: 50 MG/DL — SIGNIFICANT CHANGE UP
WBC # BLD: 6.05 K/UL — SIGNIFICANT CHANGE UP (ref 3.8–10.5)
WBC # FLD AUTO: 6.05 K/UL — SIGNIFICANT CHANGE UP (ref 3.8–10.5)

## 2024-02-29 PROCEDURE — 82962 GLUCOSE BLOOD TEST: CPT

## 2024-02-29 PROCEDURE — 80061 LIPID PANEL: CPT

## 2024-02-29 PROCEDURE — 83036 HEMOGLOBIN GLYCOSYLATED A1C: CPT

## 2024-02-29 PROCEDURE — 93010 ELECTROCARDIOGRAM REPORT: CPT

## 2024-02-29 PROCEDURE — 82803 BLOOD GASES ANY COMBINATION: CPT

## 2024-02-29 PROCEDURE — 80053 COMPREHEN METABOLIC PANEL: CPT

## 2024-02-29 PROCEDURE — 85025 COMPLETE CBC W/AUTO DIFF WBC: CPT

## 2024-02-29 PROCEDURE — 83735 ASSAY OF MAGNESIUM: CPT

## 2024-02-29 PROCEDURE — 85730 THROMBOPLASTIN TIME PARTIAL: CPT

## 2024-02-29 PROCEDURE — 85610 PROTHROMBIN TIME: CPT

## 2024-02-29 PROCEDURE — 36415 COLL VENOUS BLD VENIPUNCTURE: CPT

## 2024-02-29 PROCEDURE — 93005 ELECTROCARDIOGRAM TRACING: CPT

## 2024-02-29 RX ORDER — GLUCAGON INJECTION, SOLUTION 0.5 MG/.1ML
1 INJECTION, SOLUTION SUBCUTANEOUS ONCE
Refills: 0 | Status: DISCONTINUED | OUTPATIENT
Start: 2024-02-29 | End: 2024-03-14

## 2024-02-29 RX ORDER — SODIUM CHLORIDE 9 MG/ML
1000 INJECTION, SOLUTION INTRAVENOUS
Refills: 0 | Status: DISCONTINUED | OUTPATIENT
Start: 2024-02-29 | End: 2024-03-14

## 2024-02-29 RX ORDER — DEXTROSE 50 % IN WATER 50 %
15 SYRINGE (ML) INTRAVENOUS ONCE
Refills: 0 | Status: DISCONTINUED | OUTPATIENT
Start: 2024-02-29 | End: 2024-03-14

## 2024-02-29 RX ORDER — DEXTROSE 50 % IN WATER 50 %
25 SYRINGE (ML) INTRAVENOUS ONCE
Refills: 0 | Status: DISCONTINUED | OUTPATIENT
Start: 2024-02-29 | End: 2024-03-14

## 2024-02-29 RX ORDER — SODIUM CHLORIDE 9 MG/ML
250 INJECTION INTRAMUSCULAR; INTRAVENOUS; SUBCUTANEOUS ONCE
Refills: 0 | Status: COMPLETED | OUTPATIENT
Start: 2024-02-29 | End: 2024-02-29

## 2024-02-29 RX ORDER — DEXTROSE 50 % IN WATER 50 %
12.5 SYRINGE (ML) INTRAVENOUS ONCE
Refills: 0 | Status: DISCONTINUED | OUTPATIENT
Start: 2024-02-29 | End: 2024-03-14

## 2024-02-29 RX ORDER — INSULIN LISPRO 100/ML
VIAL (ML) SUBCUTANEOUS
Refills: 0 | Status: DISCONTINUED | OUTPATIENT
Start: 2024-02-29 | End: 2024-03-14

## 2024-02-29 RX ADMIN — SODIUM CHLORIDE 500 MILLILITER(S): 9 INJECTION INTRAMUSCULAR; INTRAVENOUS; SUBCUTANEOUS at 07:57

## 2024-02-29 RX ADMIN — SODIUM CHLORIDE 75 MILLILITER(S): 9 INJECTION INTRAMUSCULAR; INTRAVENOUS; SUBCUTANEOUS at 07:57

## 2024-03-04 VITALS
DIASTOLIC BLOOD PRESSURE: 73 MMHG | HEIGHT: 68.11 IN | HEART RATE: 66 BPM | SYSTOLIC BLOOD PRESSURE: 140 MMHG | RESPIRATION RATE: 15 BRPM | TEMPERATURE: 97 F | OXYGEN SATURATION: 97 % | WEIGHT: 164.91 LBS

## 2024-03-04 RX ORDER — GLUCAGON INJECTION, SOLUTION 0.5 MG/.1ML
1 INJECTION, SOLUTION SUBCUTANEOUS ONCE
Refills: 0 | Status: DISCONTINUED | OUTPATIENT
Start: 2024-03-07 | End: 2024-03-08

## 2024-03-04 RX ORDER — DEXTROSE 50 % IN WATER 50 %
15 SYRINGE (ML) INTRAVENOUS ONCE
Refills: 0 | Status: DISCONTINUED | OUTPATIENT
Start: 2024-03-07 | End: 2024-03-08

## 2024-03-04 RX ORDER — SODIUM CHLORIDE 9 MG/ML
1000 INJECTION, SOLUTION INTRAVENOUS
Refills: 0 | Status: DISCONTINUED | OUTPATIENT
Start: 2024-03-07 | End: 2024-03-08

## 2024-03-04 RX ORDER — CHLORHEXIDINE GLUCONATE 213 G/1000ML
1 SOLUTION TOPICAL ONCE
Refills: 0 | Status: DISCONTINUED | OUTPATIENT
Start: 2024-03-07 | End: 2024-03-08

## 2024-03-04 RX ORDER — INSULIN LISPRO 100/ML
VIAL (ML) SUBCUTANEOUS EVERY 6 HOURS
Refills: 0 | Status: DISCONTINUED | OUTPATIENT
Start: 2024-03-07 | End: 2024-03-08

## 2024-03-04 RX ORDER — DEXTROSE 50 % IN WATER 50 %
12.5 SYRINGE (ML) INTRAVENOUS ONCE
Refills: 0 | Status: DISCONTINUED | OUTPATIENT
Start: 2024-03-07 | End: 2024-03-08

## 2024-03-04 RX ORDER — DEXTROSE 50 % IN WATER 50 %
25 SYRINGE (ML) INTRAVENOUS ONCE
Refills: 0 | Status: DISCONTINUED | OUTPATIENT
Start: 2024-03-07 | End: 2024-03-08

## 2024-03-04 NOTE — H&P ADULT - ASSESSMENT
53-year-old Mandarin-speaking M with a PMH of HTN, HLD, T2DM,  Atrial fibrillation (on Eliquis: last dose 3/4/2024 PM), known CAD s/p PCI (1/2024) who presents for staged PCI LAD/LCx who now presents to [facility] for recommended cardiac cath w/ possible intervention if clinically indicated, in light of pts risk factors, CCS class III anginal symptoms and known residual CAD on prior cardiac cath.     -ASA 3, Mallampati 3  -VSS  -LOAD: Pt has not taken his Plavix x2 days. Loaded with Plavix 600mg PO and aspirin 325mg PO x1 prior to cath. Denies any recent bleeding, hematochezia melena, hematuria. H&h 14.5/41.3.   -PRECATH IVF: NS 250cc bolus then c/w maintenance NS @ 75cc/hr x 2hrs. Euvolemic on exam. Cr 0.97. EF 55-60%.   -Pre cath consented  -Suitable for moderate sedation    Risks & benefits of procedure and alternative therapy have been explained to the patient including but not limited to: allergic reaction, bleeding w/possible need for blood transfusion, infection, renal and vascular compromise, limb damage, arrhythmia, stroke, vessel dissection/perforation, Myocardial infarction, emergent CABG. Informed consent obtained and in chart.   53-year-old Mandarin-speaking M with a PMH of HTN, HLD, T2DM,  Atrial fibrillation (on Eliquis: last dose 3/4/2024 PM), known CAD s/p PCI (1/2024) who presents for staged PCI LAD/LCx who now presents to [facility] for recommended cardiac cath w/ possible intervention if clinically indicated, in light of pts risk factors, CCS class III anginal symptoms and known residual CAD on prior cardiac cath.     -ASA 3, Mallampati 3  -VSS  -Hx obtained w Mandarin  ID#[id number]  -LOAD: Pt has not taken his Plavix x2 days. Loaded with Plavix 600mg PO and aspirin 325mg PO x1 prior to cath. Denies any recent bleeding, hematochezia melena, hematuria. H&h 14.5/41.3.   -PRECATH IVF: NS 250cc bolus then c/w maintenance NS @ 75cc/hr x 2hrs. Euvolemic on exam. Cr 0.97. EF 55-60%.   -Pre cath consent obtained by fellow  -Suitable for moderate sedation    Risks & benefits of procedure and alternative therapy have been explained to the patient including but not limited to: allergic reaction, bleeding w/possible need for blood transfusion, infection, renal and vascular compromise, limb damage, arrhythmia, stroke, vessel dissection/perforation, Myocardial infarction, emergent CABG. Informed consent obtained and in chart.

## 2024-03-04 NOTE — H&P ADULT - NSICDXPASTSURGICALHX_GEN_ALL_CORE_FT
Chief complaint:   Chief Complaint   Patient presents with   • Implantable Defibrillator     Medtronic       Vitals:  Visit Vitals  /70   Pulse 94   Ht 5' 9\" (1.753 m) Comment: per pt   Wt 77.6 kg (171 lb) Comment: per pt   BMI 25.25 kg/m²       HISTORY OF PRESENT ILLNESS     HPI     Titi Gu is a 66 year old male with a history of  moderate MR, mechanical AVR and VSD repair in 1976, NSVT, CHB and atrial fibrillation for which he underwent AF ablation 10/20/2017 and 7/13/2018 ( with post ablation esophagitis) maintained on Tikosyn and warfarin, ischemic cardiomyopathy and BIVICD with most recent generator change 2/6/2017   who presents today for routine follow up. he was last seen in the EP clinic on 02/09/2022 at which time he was noted to have severe Vitamin D deficiency and was started on supplementation. Today the patient reports he is tired as he usually is but has no new symptoms in addition to this. He also reports once in awhile he has a little pain near device pocket which comes and goes within seconds. He otherwise denies chest pain, SOB, orthopnea, syncope, palpitations, dizziness or bleeding/excessive bruising. Also denies hospitalization, illness or major injury since last visit.     Most recent applicable diagnostic testing is reviewed and reveals most recent echocardiogram on 06/20/2022 showing an LVEF of 40%. Last chem panel on 09/28/2022 shows creatinine of 1.07, K 4.3.       Other significant problems:  Patient Active Problem List    Diagnosis Date Noted   • Thoracic aorta atherosclerosis (CMS/[facility]) 09/20/2022     Priority: Medium     02/25/2020 CXR     • Esophagitis 01/15/2020     Priority: Low   • CHF (congestive heart failure) (CMS/[facility]) 01/15/2020     Priority: Low   • Long term (current) use of anticoagulants 11/22/2019     Priority: Low   • NSVT (nonsustained ventricular tachycardia) 11/29/2017     Priority: Low   • History of aortic valve replacement 01/25/2017     Priority: Low    • Warfarin anticoagulation 09/12/2011     Priority: Low   • Hyperlipemia 09/12/2011     Priority: Low   • Cardiomyopathy (CMS/HCC) 09/08/2011     Priority: Low     S/p ICD.   EF 20% per nuclear stress test 2007     • Tikosyn therapy 09/08/2011     Priority: Low     On 500 mcg BID.      • Persistent atrial fibrillation (CMS/HCC) 09/08/2011     Priority: Low     Echo 11/18/2015: LVEF 37%, IVS 0.9cm, LVPW 0.8cm, LA volume 70.2 ml/m  Echo 7/23/19: LVEF 29%, IVS 0.8cm, LVPW 0.6cm, 39.8 ml/m2  Echo 2/18/20:  LVEF 29 %, IVS 1.0 cm, LVPW 1.0 cm, MANJU 58.4 ml/m2  Coronary status: h/o AVR, abn stress test 02/06/20, LHC 02/11/20   CHADS-VASC score = 2 (NICMP,HTN)  Anticoagulation therapy: Coumadin  Antiarrhythmic medication: Tikosyn  Procedure: DCCV (2007, 3/2017, 10/6/17) 10/30/17 EPS w/cryo PVI WACA and isolation LA appendage     • CHB following AV replacement, 1976 09/08/2011     Priority: Low   • Ventricular septal defect 09/08/2011     Priority: Low     congenital     • BiV ICD, Medtronic with Fedalis lead implanted 2007 & gen change in 2012 & RV lead extraction & revision with gen change 2/6/17 - - 02/2021 Medtronic early battery depletion alert  09/13/2007     Priority: Low     Medtronic early battery depletion alert (potential for shortened RRT-to-EOS).    Implanted 7/20/2012.   Has [employer] for remote follow up.  PPM dependent.   Does not leave carelink box plugged in.         PAST MEDICAL, FAMILY AND SOCIAL HISTORY     Medications:  Current Outpatient Medications   Medication Sig Dispense Refill   • dofetilide (TIKOSYN) 250 MCG capsule TAKE 1 CAPSULE BY MOUTH  TWICE DAILY 180 capsule 0   • warfarin (COUMADIN) 4 MG tablet TAKE 1 TO 1 AND 1/2 TABLETS BY MOUTH DAILY OR AS  DIRECTED 135 tablet 3   • torsemide (DEMADEX) 10 MG tablet Take 1 tablet by mouth every other day. 45 tablet 3   • sacubitril-valsartan (ENTRESTO) 24-26 MG per tablet Take 1 tablet by mouth every morning AND 1 tablet every evening. *STOP lisinopril,  START Entresto 48 hours after last dose of lisinopril* 180 tablet 3   • spironolactone (ALDACTONE) 25 MG tablet Take 0.5 tablets by mouth daily. 45 tablet 3   • atorvastatin (LIPITOR) 40 MG tablet Take 1 tablet by mouth daily. 90 tablet 3   • metoPROLOL succinate (TOPROL-XL) 50 MG 24 hr tablet Take 1 tablet by mouth daily. 90 tablet 3   • Cholecalciferol 50 mcg (2,000 units) tablet Take 1 tablet by mouth 2 times daily. 30 tablet 11   • enoxaparin (LOVENOX) 80 MG/0.8ML injectable solution Inject .8ml into skin every 12 hours as directed by the anticoagulation clinic 8 mL 1   • amoxicillin (AMOXIL) 500 MG capsule Use 4 capsules 1 hour before procedure 4 capsule 1   • Calcium Carbonate Antacid (TUMS PO) Take by mouth as needed.       No current facility-administered medications for this visit.       Allergies:  ALLERGIES:  No Known Allergies    Past Medical  History/Surgeries:  Past Medical History:   Diagnosis Date   • Anticoagulant long-term use    • Aortic valve disorders 9/8/2011   • Atrial fibrillation (CMS/HCC)     S/P CARDIOVERSION   • Complete heart block, post-surgical (CMS/HCC) 1976    S/P AORTIC VALVE REPLACEMENT   • Encounter for therapeutic drug monitoring 12/30/2019   • Erosive esophagitis    • Gastroesophageal reflux disease    • Gastroparesis    • Humerus head fracture, right, closed, initial encounter 10/10/2018   • Hyperlipidemia    • Non-ischemic cardiomyopathy (CMS/HCC)    • Pneumonia 01/01/2007    x 2   • Psoriasis     on legs   • Pulmonary hypertension (CMS/HCC)     moderate   • Seizure disorder (CMS/HCC)     medication stopped 2016   • Shoulder fracture, right 12/19/2018   • Wears glasses        Past Surgical History:   Procedure Laterality Date   • Cardiac catherization  02/11/2020    mild CAD LAD   • Cardiac electrophysiology study and ablation  10/30/2017   • Cardiac electrophysiology study and ablation  07/13/2018    atrial fibrillation, atrial flutter ablation   • Cardiac surgery      AORTIC  VALVE REPLACEMNT    • Cardioversion  3/2017; 10/06/2017   • Echo m-mode/2d/doppler (routine)  2015    LVEF 37%, IVS 0.9 cm, LVPW 0.8 cm, LA vol 70.2 ml   • Echo rhianna  2020   • Ep icd gen change  2012    Multichamber ICD generator replacement with explant of ICD generator and reinsertion of new multichamber implantable cardioverter defibrillator.   • Ep icd implant  2007    Medtronic, BiV   • Ep pacer      single, Medtronic, explanted on 2007   • Esophagogastroduodenoscopy  2018   • Icd generator change  2017    medtronic ICD, RA & RV lead extraction with placement of new RA & RV leads, subpectoral pocket revision, ICD generator change   • Vsd repair     • [place] tooth extraction         Family History:  Family History   Problem Relation Age of Onset   • Heart disease Father        Social History:  Social History     Tobacco Use   • Smoking status: Former     Packs/day: 0.20     Years: 12.00     Pack years: 2.40     Types: Cigarettes     Quit date: 2020     Years since quittin.8   • Smokeless tobacco: Never   • Tobacco comments:     none   Substance Use Topics   • Alcohol use: Yes     Alcohol/week: 4.0 standard drinks     Types: 4 Glasses of wine per week     Comment: a glass of wine with dinner.       REVIEW OF SYSTEMS     Review of Systems   Constitutional: Positive for fatigue. Negative for activity change.   Respiratory: Negative for chest tightness.    Neurological: Negative for dizziness and weakness.   All other systems reviewed and are negative.      PHYSICAL EXAM     Physical Exam  Constitutional:       Appearance: Normal appearance.   HENT:      Head: Normocephalic.   Cardiovascular:      Rate and Rhythm: Normal rate.   Pulmonary:      Effort: Pulmonary effort is normal.   Skin:     Coloration: Skin is pale.   Neurological:      General: No focal deficit present.      Mental Status: He is alert and oriented to person, place, and time.          ASSESSMENT/PLAN     Cardiomyopathy, nonischemic  - NYHA class II  - following with Dr Garza     Persistent atrial fibrillation  Echo 11/18/2015: LVEF 37%, IVS 0.9cm, LVPW 0.8cm, LA volume Echo 7/23/19:  LVEF 29 %, IVS 0.8 cm, LVPW 0.6 cm, MANJU 29.8 ml/m2  70.2 ml/m  Coronary status: no, h/o AVR   CHADS-VASC score = 1 (HTN)  Anticoagulation therapy: Coumadin  Antiarrhythmic medication: Tikosyn  Procedure: DCCV (2007,3/2017, 10/6/17) 10/30/17 EPS with cryo PVI. Redo AF ablation 7/13/18 (WACA, anterior mitral line, mitral isthmus line, and mid-distal coronary sinus).   - Minimal AF on interrogation since redo ablation      NSVT (nonsustained ventricular tachycardia)  On BB and Tikosyn     CHB following AV replacement, 1976  He is pacemaker dependent.     History of aortic valve replacement  In 1976 with mechanical valve.    On Warfarin        Warfarin anticoagulation  Tolerating well without bleeding.  Continue with INR goal 2.5-3.5 for mechanical valve and atrial fibrillation.     Tikosyn therapy  Renal function stable 09/21/2022  ECG today with stable QTc 514 msec, paced QRS        Severe Vitamin D deficiency   -improved from 12.9 to 39.6 on 05/01/2022, vitamin A was added as well, mag low normal at 1.8.         BiV ICD, Medtronic with Fedalis lead implanted 2007 & gen change in 2012 & RV lead extraction & revision with gen change 2/6/17  Device on battery recall  In person interrogation/programming done by me today shows the following:  Appropriate function and programming of Biventricular ICD  Presenting rhythm is ASBVP  Underlying rhythm is sinus with CHB  Measured lead data is stable  Remaining battery longevity is estimated at 4months  Thoacic Impedance stable  AP 0%  BVP 95.7%  optivol good  10 treated ATAF - longest 5 hours 16 min  5 NSVT longest 14 beats        Plan:  BMP, mag and Vit D level today  Continue current treatment and follow up with a visit in 6 months. Next remote device transmission in  3 months (scheduled on 03/21/2023).        PAST SURGICAL HISTORY:  H/O appendicitis     History of percutaneous angioplasty

## 2024-03-04 NOTE — H&P ADULT - HISTORY OF PRESENT ILLNESS
Cardiologist: Dr. Reza  Pharmacy:  [facility]  Escort: wife      53-year-old Mandarin-speaking male with a PMH of HTN, HLD, T2DM,  Atrial fibrillation (on Eliquis:  last dose_____), known CAD s/p PCI (1/2024) who presents for staged PCI. Patient followed up with outpatient cardiologist Dr. Reza reporting persistent intermittent substernal chest pressure on brisk ambulation or incline that is better than prior to cath. Patient notes persistent feeling of intermittent palpitations and is scheduled for an ablation in March 2024 with Dr. Leung. Patient reports good adherence to DAPT since procedure. Patient  denies N/V, dizziness, PND/orthopnea and syncope.       Cardiac cath (1/8/24): DESx3 p/m/dRCA , pLAD 30-50%, mLAD 60-70%, mLCx 60-70%, dLCx 90%. EDP 8, Access: right radial/right groin PC.   TTE (10/16/23):  normal LVSF EF 55-60%, mild concentric LV hypertrophy, LA mildly dilated, aortic root mildly dilated, no significant changes compared to TTE on 11/16/22.      In light of patient's risk factors, CCS Class III anginal symptoms, and residual CAD on prior cardiac cath, patient is referred to [facility] for cardiac catheterization with possible intervention if clinically indicated.      Cardiologist: Dr. Reza  Pharmacy:  [facility]  Escort: wife      53-year-old Mandarin-speaking male with a PMH of HTN, HLD, T2DM,  Atrial fibrillation (on Eliquis: last dose 3/4/2024 PM), known CAD s/p PCI (1/2024) who presents for staged PCI LAD/LCx. . Patient followed up with outpatient cardiologist Dr. Reza reporting persistent intermittent substernal chest pressure on brisk ambulation or incline that is better than prior to cath. Patient notes persistent feeling of intermittent palpitations and is scheduled for an ablation in March 2024 with Dr. Leung. Patient reports good adherence to DAPT since procedure. Patient  denies N/V, dizziness, PND/orthopnea and syncope.       Cardiac cath (1/8/24): DESx3 p/m/dRCA , pLAD 30-50%, mLAD 60-70%, mLCx 60-70%, dLCx 90%. EDP 8, Access: right radial/right groin PC.   TTE (10/16/23):  normal LVSF EF 55-60%, mild concentric LV hypertrophy, LA mildly dilated, aortic root mildly dilated, no significant changes compared to TTE on 11/16/22.      In light of patient's risk factors, CCS Class III anginal symptoms, and residual CAD on prior cardiac cath, patient is referred to [facility] for cardiac catheterization with possible intervention if clinically indicated.      Cardiologist: Dr. Reza  Pharmacy:  [facility]  Escort: wife      53-year-old Mandarin-speaking male with a PMH of HTN, HLD, T2DM,  Atrial fibrillation (on Eliquis: last dose 3/4/2024 PM), known CAD s/p PCI (1/2024) who presents for staged PCI LAD/LCx. . Patient followed up with outpatient cardiologist Dr. Reza reporting persistent intermittent substernal chest pressure on brisk ambulation or incline that is better than prior to cath. Patient notes persistent feeling of intermittent palpitations and is scheduled for an ablation in March 2024 with Dr. Leung. Patient reports good adherence to DAPT since procedure. Patient  denies N/V, dizziness, PND/orthopnea and syncope.       Cardiac cath (1/8/24): DESx3 p/m/dRCA , pLAD 30-50%, mLAD 60-70%, mLCx 60-70%, dLCx 90%. EDP 8, Access: right radial/right groin PC.   TTE (10/16/23):  normal LVSF EF 55-60%, mild concentric LV hypertrophy, LA mildly dilated, aortic root mildly dilated, no significant changes compared to TTE on 11/16/22.      In light of patient's risk factors, CCS Class III anginal symptoms, and residual CAD on prior cardiac cath, patient is referred to [facility] for cardiac catheterization with possible intervention if clinically indicated.     (Of note: Patient was initially scheduled last week but took his Eliquis and was sent home with plan to return 3/7/2024)

## 2024-03-04 NOTE — H&P ADULT - HEART RATE (BEATS/MIN)
Pt combative, agitated,attempting to hit staff, trying to get out of bed, screaming and asking to see her daughter.  Try to calm pt, reoriented and let her know she is in the hospital. 66

## 2024-03-04 NOTE — H&P ADULT - NSHPLABSRESULTS_GEN_ALL_CORE
EKG: A.fib 76, non ischemic                            14.5   4.68  )-----------( 122      ( 07 Mar 2024 06:36 )             41.3       03-07    142  |  109<H>  |  23  ----------------------------<  156<H>  4.0   |  24  |  0.97    Ca    8.8      07 Mar 2024 06:35    TPro  5.9<L>  /  Alb  4.0  /  TBili  1.0  /  DBili  x   /  AST  32  /  ALT  75<H>  /  AlkPhos  97  03-07      PT/INR - ( 07 Mar 2024 06:36 )   PT: 10.7 sec;   INR: 0.94          PTT - ( 07 Mar 2024 06:36 )  PTT:31.0 sec    CARDIAC MARKERS ( 07 Mar 2024 06:35 )  x     / x     / 118 U/L / x     / x            Urinalysis Basic - ( 07 Mar 2024 06:35 )    Color: x / Appearance: x / SG: x / pH: x  Gluc: 156 mg/dL / Ketone: x  / Bili: x / Urobili: x   Blood: x / Protein: x / Nitrite: x   Leuk Esterase: x / RBC: x / WBC x   Sq Epi: x / Non Sq Epi: x / Bacteria: x

## 2024-03-07 ENCOUNTER — INPATIENT (INPATIENT)
Facility: HOSPITAL | Age: 54
LOS: 0 days | Discharge: HOME | DRG: 321 | End: 2024-03-08
Attending: INTERNAL MEDICINE | Admitting: INTERNAL MEDICINE
Payer: COMMERCIAL

## 2024-03-07 ENCOUNTER — TRANSCRIPTION ENCOUNTER (OUTPATIENT)
Age: 54
End: 2024-03-07

## 2024-03-07 DIAGNOSIS — Z98.890 OTHER SPECIFIED POSTPROCEDURAL STATES: Chronic | ICD-10-CM

## 2024-03-07 DIAGNOSIS — Z87.19 PERSONAL HISTORY OF OTHER DISEASES OF THE DIGESTIVE SYSTEM: Chronic | ICD-10-CM

## 2024-03-07 LAB
A1C WITH ESTIMATED AVERAGE GLUCOSE RESULT: 7.4 % — HIGH (ref 4–5.6)
ADD ON TEST-SPECIMEN IN LAB: SIGNIFICANT CHANGE UP
ALBUMIN SERPL ELPH-MCNC: 4 G/DL — SIGNIFICANT CHANGE UP (ref 3.3–5)
ALP SERPL-CCNC: 97 U/L — SIGNIFICANT CHANGE UP (ref 40–120)
ALT FLD-CCNC: 75 U/L — HIGH (ref 10–45)
ANION GAP SERPL CALC-SCNC: 9 MMOL/L — SIGNIFICANT CHANGE UP (ref 5–17)
APTT BLD: 31 SEC — SIGNIFICANT CHANGE UP (ref 24.5–35.6)
AST SERPL-CCNC: 32 U/L — SIGNIFICANT CHANGE UP (ref 10–40)
BASOPHILS # BLD AUTO: 0.03 K/UL — SIGNIFICANT CHANGE UP (ref 0–0.2)
BASOPHILS NFR BLD AUTO: 0.6 % — SIGNIFICANT CHANGE UP (ref 0–2)
BILIRUB SERPL-MCNC: 1 MG/DL — SIGNIFICANT CHANGE UP (ref 0.2–1.2)
BUN SERPL-MCNC: 23 MG/DL — SIGNIFICANT CHANGE UP (ref 7–23)
CALCIUM SERPL-MCNC: 8.8 MG/DL — SIGNIFICANT CHANGE UP (ref 8.4–10.5)
CHLORIDE SERPL-SCNC: 109 MMOL/L — HIGH (ref 96–108)
CHOLEST SERPL-MCNC: 120 MG/DL — SIGNIFICANT CHANGE UP
CK SERPL-CCNC: 118 U/L — SIGNIFICANT CHANGE UP (ref 30–200)
CO2 SERPL-SCNC: 24 MMOL/L — SIGNIFICANT CHANGE UP (ref 22–31)
CREAT SERPL-MCNC: 0.97 MG/DL — SIGNIFICANT CHANGE UP (ref 0.5–1.3)
EGFR: 93 ML/MIN/1.73M2 — SIGNIFICANT CHANGE UP
EOSINOPHIL # BLD AUTO: 0.07 K/UL — SIGNIFICANT CHANGE UP (ref 0–0.5)
EOSINOPHIL NFR BLD AUTO: 1.5 % — SIGNIFICANT CHANGE UP (ref 0–6)
ESTIMATED AVERAGE GLUCOSE: 166 MG/DL — HIGH (ref 68–114)
GLUCOSE BLDC GLUCOMTR-MCNC: 108 MG/DL — HIGH (ref 70–99)
GLUCOSE BLDC GLUCOMTR-MCNC: 127 MG/DL — HIGH (ref 70–99)
GLUCOSE BLDC GLUCOMTR-MCNC: 140 MG/DL — HIGH (ref 70–99)
GLUCOSE BLDC GLUCOMTR-MCNC: 151 MG/DL — HIGH (ref 70–99)
GLUCOSE SERPL-MCNC: 156 MG/DL — HIGH (ref 70–99)
HCT VFR BLD CALC: 41.3 % — SIGNIFICANT CHANGE UP (ref 39–50)
HDLC SERPL-MCNC: 47 MG/DL — SIGNIFICANT CHANGE UP
HGB BLD-MCNC: 14.5 G/DL — SIGNIFICANT CHANGE UP (ref 13–17)
IMM GRANULOCYTES NFR BLD AUTO: 0.4 % — SIGNIFICANT CHANGE UP (ref 0–0.9)
INR BLD: 0.94 — SIGNIFICANT CHANGE UP (ref 0.85–1.18)
ISTAT ACTK (ACTIVATED CLOTTING TIME KAOLIN): 266 SEC — HIGH (ref 74–137)
ISTAT ACTK (ACTIVATED CLOTTING TIME KAOLIN): 277 SEC — HIGH (ref 74–137)
ISTAT ACTK (ACTIVATED CLOTTING TIME KAOLIN): 406 SEC — HIGH (ref 74–137)
LIPID PNL WITH DIRECT LDL SERPL: 63 MG/DL — SIGNIFICANT CHANGE UP
LYMPHOCYTES # BLD AUTO: 1.58 K/UL — SIGNIFICANT CHANGE UP (ref 1–3.3)
LYMPHOCYTES # BLD AUTO: 33.8 % — SIGNIFICANT CHANGE UP (ref 13–44)
MAGNESIUM SERPL-MCNC: 2 MG/DL — SIGNIFICANT CHANGE UP (ref 1.6–2.6)
MCHC RBC-ENTMCNC: 31.6 PG — SIGNIFICANT CHANGE UP (ref 27–34)
MCHC RBC-ENTMCNC: 35.1 GM/DL — SIGNIFICANT CHANGE UP (ref 32–36)
MCV RBC AUTO: 90 FL — SIGNIFICANT CHANGE UP (ref 80–100)
MONOCYTES # BLD AUTO: 0.37 K/UL — SIGNIFICANT CHANGE UP (ref 0–0.9)
MONOCYTES NFR BLD AUTO: 7.9 % — SIGNIFICANT CHANGE UP (ref 2–14)
NEUTROPHILS # BLD AUTO: 2.61 K/UL — SIGNIFICANT CHANGE UP (ref 1.8–7.4)
NEUTROPHILS NFR BLD AUTO: 55.8 % — SIGNIFICANT CHANGE UP (ref 43–77)
NON HDL CHOLESTEROL: 73 MG/DL — SIGNIFICANT CHANGE UP
NRBC # BLD: 0 /100 WBCS — SIGNIFICANT CHANGE UP (ref 0–0)
PLATELET # BLD AUTO: 122 K/UL — LOW (ref 150–400)
POCT ISTAT CREATININE: 1 MG/DL — SIGNIFICANT CHANGE UP (ref 0.5–1.3)
POTASSIUM SERPL-MCNC: 4 MMOL/L — SIGNIFICANT CHANGE UP (ref 3.5–5.3)
POTASSIUM SERPL-SCNC: 4 MMOL/L — SIGNIFICANT CHANGE UP (ref 3.5–5.3)
PROT SERPL-MCNC: 5.9 G/DL — LOW (ref 6–8.3)
PROTHROM AB SERPL-ACNC: 10.7 SEC — SIGNIFICANT CHANGE UP (ref 9.5–13)
RBC # BLD: 4.59 M/UL — SIGNIFICANT CHANGE UP (ref 4.2–5.8)
RBC # FLD: 11.6 % — SIGNIFICANT CHANGE UP (ref 10.3–14.5)
SODIUM SERPL-SCNC: 142 MMOL/L — SIGNIFICANT CHANGE UP (ref 135–145)
TRIGL SERPL-MCNC: 52 MG/DL — SIGNIFICANT CHANGE UP
WBC # BLD: 4.68 K/UL — SIGNIFICANT CHANGE UP (ref 3.8–10.5)
WBC # FLD AUTO: 4.68 K/UL — SIGNIFICANT CHANGE UP (ref 3.8–10.5)

## 2024-03-07 PROCEDURE — 93010 ELECTROCARDIOGRAM REPORT: CPT

## 2024-03-07 RX ORDER — ATORVASTATIN CALCIUM 80 MG/1
1 TABLET, FILM COATED ORAL
Refills: 0 | DISCHARGE

## 2024-03-07 RX ORDER — APIXABAN 2.5 MG/1
5 TABLET, FILM COATED ORAL EVERY 12 HOURS
Refills: 0 | Status: DISCONTINUED | OUTPATIENT
Start: 2024-03-08 | End: 2024-03-08

## 2024-03-07 RX ORDER — SODIUM CHLORIDE 9 MG/ML
1000 INJECTION INTRAMUSCULAR; INTRAVENOUS; SUBCUTANEOUS
Refills: 0 | Status: DISCONTINUED | OUTPATIENT
Start: 2024-03-07 | End: 2024-03-07

## 2024-03-07 RX ORDER — CLOPIDOGREL BISULFATE 75 MG/1
75 TABLET, FILM COATED ORAL DAILY
Refills: 0 | Status: DISCONTINUED | OUTPATIENT
Start: 2024-03-08 | End: 2024-03-08

## 2024-03-07 RX ORDER — SODIUM CHLORIDE 9 MG/ML
1000 INJECTION INTRAMUSCULAR; INTRAVENOUS; SUBCUTANEOUS
Refills: 0 | Status: DISCONTINUED | OUTPATIENT
Start: 2024-03-07 | End: 2024-03-08

## 2024-03-07 RX ORDER — METOPROLOL TARTRATE 50 MG
1 TABLET ORAL
Refills: 0 | DISCHARGE

## 2024-03-07 RX ORDER — SODIUM CHLORIDE 9 MG/ML
250 INJECTION INTRAMUSCULAR; INTRAVENOUS; SUBCUTANEOUS ONCE
Refills: 0 | Status: COMPLETED | OUTPATIENT
Start: 2024-03-07 | End: 2024-03-07

## 2024-03-07 RX ORDER — ASPIRIN/CALCIUM CARB/MAGNESIUM 324 MG
81 TABLET ORAL ONCE
Refills: 0 | Status: DISCONTINUED | OUTPATIENT
Start: 2024-03-07 | End: 2024-03-07

## 2024-03-07 RX ORDER — ASPIRIN/CALCIUM CARB/MAGNESIUM 324 MG
325 TABLET ORAL ONCE
Refills: 0 | Status: COMPLETED | OUTPATIENT
Start: 2024-03-07 | End: 2024-03-07

## 2024-03-07 RX ORDER — METOPROLOL TARTRATE 50 MG
50 TABLET ORAL DAILY
Refills: 0 | Status: DISCONTINUED | OUTPATIENT
Start: 2024-03-07 | End: 2024-03-08

## 2024-03-07 RX ORDER — LISINOPRIL 2.5 MG/1
1 TABLET ORAL
Refills: 0 | DISCHARGE

## 2024-03-07 RX ORDER — CLOPIDOGREL BISULFATE 75 MG/1
75 TABLET, FILM COATED ORAL ONCE
Refills: 0 | Status: DISCONTINUED | OUTPATIENT
Start: 2024-03-07 | End: 2024-03-07

## 2024-03-07 RX ORDER — ASPIRIN/CALCIUM CARB/MAGNESIUM 324 MG
81 TABLET ORAL DAILY
Refills: 0 | Status: DISCONTINUED | OUTPATIENT
Start: 2024-03-08 | End: 2024-03-08

## 2024-03-07 RX ORDER — ATORVASTATIN CALCIUM 80 MG/1
40 TABLET, FILM COATED ORAL AT BEDTIME
Refills: 0 | Status: DISCONTINUED | OUTPATIENT
Start: 2024-03-07 | End: 2024-03-08

## 2024-03-07 RX ORDER — CLOPIDOGREL BISULFATE 75 MG/1
150 TABLET, FILM COATED ORAL ONCE
Refills: 0 | Status: DISCONTINUED | OUTPATIENT
Start: 2024-03-07 | End: 2024-03-07

## 2024-03-07 RX ORDER — CLOPIDOGREL BISULFATE 75 MG/1
600 TABLET, FILM COATED ORAL ONCE
Refills: 0 | Status: COMPLETED | OUTPATIENT
Start: 2024-03-07 | End: 2024-03-07

## 2024-03-07 RX ORDER — LISINOPRIL 2.5 MG/1
10 TABLET ORAL DAILY
Refills: 0 | Status: DISCONTINUED | OUTPATIENT
Start: 2024-03-07 | End: 2024-03-08

## 2024-03-07 RX ADMIN — CLOPIDOGREL BISULFATE 600 MILLIGRAM(S): 75 TABLET, FILM COATED ORAL at 07:48

## 2024-03-07 RX ADMIN — SODIUM CHLORIDE 75 MILLILITER(S): 9 INJECTION INTRAMUSCULAR; INTRAVENOUS; SUBCUTANEOUS at 07:27

## 2024-03-07 RX ADMIN — SODIUM CHLORIDE 150 MILLILITER(S): 9 INJECTION INTRAMUSCULAR; INTRAVENOUS; SUBCUTANEOUS at 11:05

## 2024-03-07 RX ADMIN — Medication 325 MILLIGRAM(S): at 07:48

## 2024-03-07 RX ADMIN — SODIUM CHLORIDE 500 MILLILITER(S): 9 INJECTION INTRAMUSCULAR; INTRAVENOUS; SUBCUTANEOUS at 07:28

## 2024-03-07 RX ADMIN — Medication 50 MILLIGRAM(S): at 12:59

## 2024-03-07 RX ADMIN — LISINOPRIL 10 MILLIGRAM(S): 2.5 TABLET ORAL at 12:59

## 2024-03-07 RX ADMIN — ATORVASTATIN CALCIUM 40 MILLIGRAM(S): 80 TABLET, FILM COATED ORAL at 22:10

## 2024-03-07 NOTE — PATIENT PROFILE ADULT - FALL HARM RISK - RISK INTERVENTIONS
Assistance OOB with selected safe patient handling equipment/Assistance with ambulation/Communicate Fall Risk and Risk Factors to all staff, patient, and family/Monitor gait and stability/Reinforce activity limits and safety measures with patient and family/Sit up slowly, dangle for a short time, stand at bedside before walking/Use of alarms - bed, chair and/or voice tab/Visual Cue: Yellow wristband/Bed in lowest position, wheels locked, appropriate side rails in place/Call bell, personal items and telephone in reach/Instruct patient to call for assistance before getting out of bed or chair/Non-slip footwear when patient is out of bed/[place] to call system/Physically safe environment - no spills, clutter or unnecessary equipment/Purposeful Proactive Rounding/Room/bathroom lighting operational, light cord in reach

## 2024-03-07 NOTE — DISCHARGE NOTE PROVIDER - NSDCCPCAREPLAN_GEN_ALL_CORE_FT
PRINCIPAL DISCHARGE DIAGNOSIS  Diagnosis: CAD (coronary artery disease)  Assessment and Plan of Treatment: You were found to have blockages in the arteries of your heart, also known as Coronary Artery Disease. You underwent a cardiac catheterization on 3/7/24 and received 2 stents to the left circumflex artery and 2 stents to the left anterior descending artery.  PLEASE CONTINUE ASPIRIN 81MG DAILY AND PLAVIX 75MG DAILY. DO NOT STOP THESE MEDICATIONS FOR ANY REASON AS THEY ARE KEEPING YOUR STENT OPEN AND PREVENTING A HEART ATTACK.   - After 1 week you may STOP taking the aspirin.   Avoid strenuous activity or heavy lifting anything more than 5lbs for the next five days. Do not take a bath or swim for the next five days; you may shower. For any bleeding or hematoma formation (hardened blood collection under the skin) at the access site of your right groin please hold pressure and go to the emergency room. Please follow up with Dr. Reza in 1-2 weeks. For recurrent chest pain, please call your doctor or go to the emergency room.        SECONDARY DISCHARGE DIAGNOSES  Diagnosis: HTN (hypertension)  Assessment and Plan of Treatment: Please continue all medications as listed to keep your blood pressure controlled. For blood pressure that is too high or too low please see your doctor or go to the emergency room as necessary.      Diagnosis: HLD (hyperlipidemia)  Assessment and Plan of Treatment: Please continue Lipitor 40mg at bedtime to keep your cholesterol low. High cholesterol contributes to heart disease.      Diagnosis: Atrial fibrillation  Assessment and Plan of Treatment: You have an abnormal heart rhythm (arrhythmia) called atrial fibrillation. With this condition, the hearts 2 upper chambers (the atria) quiver rather than squeeze the blood out in a normal pattern. This leads to an irregular and sometimes rapid heartbeat. Atrial fibrillation is serious condition as it affects the heart’s ability to fill with blood, and the blood can start to form clots.  These clots can travel to the brain through the blood vessels, and cause strokes.    -Please CONTINUE  ELIQUIS 5MG TWICE A DAY to prevent a stroke by helping to prevent clots from forming.   -Please CONTINUE TOPROL 50MG DAILY to keep your heart rate regular      Diagnosis: Type 2 diabetes mellitus  Assessment and Plan of Treatment: Your Hemoglobin A1c is 7.4 and your goal A1c is less than 7.0%. This number measures your average blood sugar level over the last three months.  Please continue all medications as listed for diabetes. Maintain a low carbohydrate, low sugar diet, exercise, monitor your fingerstick blood sugars regarly and follow up with your Endocrinologist/Primary Care Doctor.  If you are a diabetic and you take Metformin: DO NOT TAKE your Metformin for two days. This medication can interact with the contrast used during your procedure therefore we want to ensure the contrast has left your body prior to you restarting your Metformin.       PRINCIPAL DISCHARGE DIAGNOSIS  Diagnosis: CAD (coronary artery disease)  Assessment and Plan of Treatment: You were found to have blockages in the arteries of your heart, also known as Coronary Artery Disease. You underwent a cardiac catheterization on 3/7/24 and received 2 stents to the left circumflex artery and 2 stents to the left anterior descending artery.  PLEASE CONTINUE ASPIRIN 81MG DAILY AND PLAVIX 75MG DAILY. DO NOT STOP THESE MEDICATIONS FOR ANY REASON AS THEY ARE KEEPING YOUR STENT OPEN AND PREVENTING A HEART ATTACK.   - After 1 week you may STOP taking the aspirin.   -You will continue Eliquis 5mg twice a day uninterrupted otherwise.   Avoid strenuous activity or heavy lifting anything more than 5lbs for the next five days. Do not take a bath or swim for the next five days; you may shower. For any bleeding or hematoma formation (hardened blood collection under the skin) at the access site of your right groin please hold pressure and go to the emergency room. Please follow up with Dr. Reza in 1-2 weeks. For recurrent chest pain, please call your doctor or go to the emergency room.        SECONDARY DISCHARGE DIAGNOSES  Diagnosis: HTN (hypertension)  Assessment and Plan of Treatment: Please continue all medications as listed to keep your blood pressure controlled. For blood pressure that is too high or too low please see your doctor or go to the emergency room as necessary.      Diagnosis: HLD (hyperlipidemia)  Assessment and Plan of Treatment: Please continue Lipitor 40mg at bedtime to keep your cholesterol low. High cholesterol contributes to heart disease.      Diagnosis: Atrial fibrillation  Assessment and Plan of Treatment: You have an abnormal heart rhythm (arrhythmia) called atrial fibrillation. With this condition, the hearts 2 upper chambers (the atria) quiver rather than squeeze the blood out in a normal pattern. This leads to an irregular and sometimes rapid heartbeat. Atrial fibrillation is serious condition as it affects the heart’s ability to fill with blood, and the blood can start to form clots.  These clots can travel to the brain through the blood vessels, and cause strokes.    -Please CONTINUE  ELIQUIS 5MG TWICE A DAY to prevent a stroke by helping to prevent clots from forming.   -Please CONTINUE TOPROL 50MG DAILY to keep your heart rate regular      Diagnosis: Type 2 diabetes mellitus  Assessment and Plan of Treatment: Your Hemoglobin A1c is 7.4 and your goal A1c is less than 7.0%. This number measures your average blood sugar level over the last three months.  Please continue all medications as listed for diabetes. Maintain a low carbohydrate, low sugar diet, exercise, monitor your fingerstick blood sugars regarly and follow up with your Endocrinologist/Primary Care Doctor.  If you are a diabetic and you take Metformin: DO NOT TAKE your Metformin for two days. This medication can interact with the contrast used during your procedure therefore we want to ensure the contrast has left your body prior to you restarting your Metformin.

## 2024-03-07 NOTE — DISCHARGE NOTE PROVIDER - NSDCMRMEDTOKEN_GEN_ALL_CORE_FT
clopidogrel 75 mg oral tablet: 1 tab(s) orally once a day  Eliquis 5 mg oral tablet: 1 tab(s) orally 2 times a day  Lipitor 40 mg oral tablet: 1 tab(s) orally once a day  lisinopril 10 mg oral tablet: 1 tab(s) orally once a day  metFORMIN 500 mg oral tablet, extended release: 1 tab(s) orally once a day  metoprolol succinate 50 mg oral capsule, extended release: 1 cap(s) orally once a day   aspirin 81 mg oral delayed release tablet: 1 tab(s) orally once a day  Cardiac rehab, 3x/week x 12 weeks for diagnosis CAD. Cardiologist Dr. Reza: Cardiac rehab, 3x/week x 12 weeks for diagnosis CAD. Cardiologist Dr. Reza  clopidogrel 75 mg oral tablet: 1 tab(s) orally once a day  Eliquis 5 mg oral tablet: 1 tab(s) orally 2 times a day  Lipitor 40 mg oral tablet: 1 tab(s) orally once a day  lisinopril 10 mg oral tablet: 1 tab(s) orally once a day  metFORMIN 500 mg oral tablet, extended release: 1 tab(s) orally once a day Please hold Metformin and resume on 3/10/24  metoprolol succinate 50 mg oral capsule, extended release: 1 cap(s) orally once a day

## 2024-03-07 NOTE — DISCHARGE NOTE PROVIDER - ATTENDING DISCHARGE PHYSICAL EXAMINATION:
0700 Bedside and Verbal shift change report given to RAHUL Tobar RN and EDGAR Mcallister RN (oncoming nurse) by Kenyetta Bundy RN (offgoing nurse). Report included the following information SBAR, Kardex, Procedure Summary, Intake/Output, MAR, Recent Results, Procedure Verification, Quality Measures, and Dual Neuro Assessment.  #[id number] [address] Assisted to lateral left runners lunge with peanut ball.     Madhu Lund #[id number] [facility]. This RN at bedside due to pt's increased pressure. Assisted to lateral right runners lunge with peanut ball. [id number] [place]. Pt requesting SVE due to constant pressure. SVE performed by EDGAR Mcallister RN: 10/+1.     7508 EDGAR Mcallister RN called Dr. Rena Pantoja to come to bedside to assess pt.     Atiya Hardy  #[id number] Ed. Pt educated on pushing. Verbalizes understanding. Pt hesitant to push. 0840 Pt prefers to labor down. Assisted to throne position with closed knees. Instructed to call RN when needed. 0714 Patient actively pushing. RN remains in continuous attendance at the bedside. Assessment & evaluation of fetal heart rate ongoing via continuous EFM.     6755 RN remained at bedside throughout pushing. EFM continuously assessed. Vaginal delivery of viable infant. 5133 Placenta expressed. QBL 20. 2nd degree perineal laceration with repair. [id number]  [id number] 38. 1110 Pt up to bedside commode. Unable to void. [facility]. 1156 Pt up to bedside commode. Unable to void. 1208 Pt up to bathroom to void. 440mL output. 1310 Pt up to bathroom to void. 465mL output. 1335 Pt transferred to MIU.     1345 Report given to MIU RN JENNIFER Mi. 53-year-old Mandarin-speaking male with a PMH of HTN, HLD, T2DM,  Atrial fibrillation, known CAD s/p DESx3 p/m/dRCA (1/2024) who presented to [facility] for recommended cardiac cath w/ possible intervention if clinically indicated, in light of pts risk factors, CCS class III anginal symptoms and residual CAD on prior cath.     1. CAD  S/p cardiac cath on 3/7/24 with Dr. Reza/IC Fellow [facility]; OCT guided ATIF x2 p/dLCx (70-80%), ATIF x2 p/mLAD (70-80%); LM normal, RCA stents patent, access R FA PC.   Follow up with Dr. Reza in 1-2 weeks.  ASA 81mg QD, Plavix 75mg QD, Eliquis 5mg BID, (Triple therapy x 1 week then d/c aspirin), Lipitor 40mg QHS, Lisinopril 40mg QD, Toprol 50mg QD

## 2024-03-07 NOTE — DISCHARGE NOTE PROVIDER - CARE PROVIDER_API CALL
Kwame Reza  Cardiology  [address] [place] [address], [address]  [place] [address]  Phone: [phone]  Fax: [phone]  Follow Up Time: 2 weeks

## 2024-03-07 NOTE — DISCHARGE NOTE PROVIDER - ATTENDING ATTESTATION STATEMENT
Summary of plan:  1. Follow-up in 3 months  2. Consider lab work at that time - ordered   3. Continue medications as they are    
I have personally seen and examined the patient. I have collaborated with and supervised the

## 2024-03-07 NOTE — DISCHARGE NOTE PROVIDER - HOSPITAL COURSE
INCOMPLETE       53-year-old Mandarin-speaking male with a PMH of HTN, HLD, T2DM,  Atrial fibrillation, known CAD s/p DESx3 p/m/dRCA (1/2024) who presented to [facility] for recommended cardiac cath w/ possible intervention if clinically indicated, in light of pts risk factors, CCS class III anginal symptoms and residual CAD on prior cath. Pt now s/p cardiac cath on 3/7/24 with Dr. Reza/IC Fellow [facility]; OCT guided ATIF x2 p/dLCx (70-80%), ATIF x2 p/mLAD (70-80%); LM normal, RCA stents patent, access R FA PC. Pt admitted overnight for observation and telemetry monitoring. Pt seen and examined at bedside this AM without any complaints or events overnight, VSS, labs and telemetry reviewed and pt stable for discharge as discussed with Dr. Shepherd. Pt has received appropriate discharge instructions, including medication regimen, access site management and follow up with Dr. Reza in 1-2 weeks.    Discharge medications: ASA 81mg QD, Plavix 75mg QD, Eliquis 5mg BID, (TAPT x1 week then d/c aspirin), Lipitor 40mg QHS, Lisinopril 40mg QD, Toprol 50mg QD    Cardiac Rehab (Post PCI): Education on benefits of Cardiac Rehab provided to patient: Yes, Referral and Prescription Given for Cardiac Rehab: Yes, Pt given list of locations & instructed to contact their insurance company to review list of participating providers.    GLP-1 receptor agonist/SGLT2 inhibitor meds discussed w/ patient and encouraged to discuss further with PMD or Endocrinologist at next visit.    Pt discharge copies detail cardiovascular history, medications, testing/treatments, OR patient has created a portal account and instructed to provide their records at their 1st appointment.     53-year-old Mandarin-speaking male with a PMH of HTN, HLD, T2DM,  Atrial fibrillation, known CAD s/p DESx3 p/m/dRCA (1/2024) who presented to [facility] for recommended cardiac cath w/ possible intervention if clinically indicated, in light of pts risk factors, CCS class III anginal symptoms and residual CAD on prior cath. Pt now s/p cardiac cath on 3/7/24 with Dr. Reza/IC Fellow [facility]ros; OCT guided ATIF x2 p/dLCx (70-80%), ATIF x2 p/mLAD (70-80%); LM normal, RCA stents patent, access R FA PC.     Pt admitted overnight for observation and telemetry monitoring. Pt seen and examined at bedside this AM without any complaints or events overnight, VSS, labs and telemetry reviewed and pt stable for discharge as discussed with Dr. Shepherd. Patient was noted to have some blisters at the access site of cardiac cath likely suspected from the dressing post procedure. Will continue with symptomatic management. Pt has received appropriate discharge instructions, including medication regimen, access site management and follow up with Dr. Reza in 1-2 weeks.    Discharge medications: ASA 81mg QD, Plavix 75mg QD, Eliquis 5mg BID, (Triple therapy x 1 week then d/c aspirin), Lipitor 40mg QHS, Lisinopril 40mg QD, Toprol 50mg QD    Cardiac Rehab (Post PCI): Education on benefits of Cardiac Rehab provided to patient: Yes, Referral and Prescription Given for Cardiac Rehab: Yes, Pt given list of locations & instructed to contact their insurance company to review list of participating providers.    GLP-1 receptor agonist/SGLT2 inhibitor meds discussed w/ patient and encouraged to discuss further with PMD or Endocrinologist at next visit.    Pt discharge copies detail cardiovascular history, medications, testing/treatments, OR patient has created a portal account and instructed to provide their records at their 1st appointment.

## 2024-03-08 ENCOUNTER — TRANSCRIPTION ENCOUNTER (OUTPATIENT)
Age: 54
End: 2024-03-08

## 2024-03-08 VITALS
RESPIRATION RATE: 18 BRPM | DIASTOLIC BLOOD PRESSURE: 72 MMHG | TEMPERATURE: 98 F | OXYGEN SATURATION: 96 % | HEART RATE: 69 BPM | SYSTOLIC BLOOD PRESSURE: 119 MMHG

## 2024-03-08 LAB
ANION GAP SERPL CALC-SCNC: 5 MMOL/L — SIGNIFICANT CHANGE UP (ref 5–17)
BUN SERPL-MCNC: 17 MG/DL — SIGNIFICANT CHANGE UP (ref 7–23)
CALCIUM SERPL-MCNC: 9 MG/DL — SIGNIFICANT CHANGE UP (ref 8.4–10.5)
CHLORIDE SERPL-SCNC: 106 MMOL/L — SIGNIFICANT CHANGE UP (ref 96–108)
CO2 SERPL-SCNC: 26 MMOL/L — SIGNIFICANT CHANGE UP (ref 22–31)
CREAT SERPL-MCNC: 1.1 MG/DL — SIGNIFICANT CHANGE UP (ref 0.5–1.3)
EGFR: 80 ML/MIN/1.73M2 — SIGNIFICANT CHANGE UP
GLUCOSE BLDC GLUCOMTR-MCNC: 131 MG/DL — HIGH (ref 70–99)
GLUCOSE SERPL-MCNC: 149 MG/DL — HIGH (ref 70–99)
HCT VFR BLD CALC: 43.6 % — SIGNIFICANT CHANGE UP (ref 39–50)
HGB BLD-MCNC: 15.3 G/DL — SIGNIFICANT CHANGE UP (ref 13–17)
ISTAT INR: 1.3 — HIGH (ref 0.88–1.16)
ISTAT PT: 15 SEC — HIGH (ref 10–12.9)
MAGNESIUM SERPL-MCNC: 2 MG/DL — SIGNIFICANT CHANGE UP (ref 1.6–2.6)
MCHC RBC-ENTMCNC: 31.2 PG — SIGNIFICANT CHANGE UP (ref 27–34)
MCHC RBC-ENTMCNC: 35.1 GM/DL — SIGNIFICANT CHANGE UP (ref 32–36)
MCV RBC AUTO: 88.8 FL — SIGNIFICANT CHANGE UP (ref 80–100)
NRBC # BLD: 0 /100 WBCS — SIGNIFICANT CHANGE UP (ref 0–0)
PLATELET # BLD AUTO: 121 K/UL — LOW (ref 150–400)
POTASSIUM SERPL-MCNC: 4.4 MMOL/L — SIGNIFICANT CHANGE UP (ref 3.5–5.3)
POTASSIUM SERPL-SCNC: 4.4 MMOL/L — SIGNIFICANT CHANGE UP (ref 3.5–5.3)
RBC # BLD: 4.91 M/UL — SIGNIFICANT CHANGE UP (ref 4.2–5.8)
RBC # FLD: 11.4 % — SIGNIFICANT CHANGE UP (ref 10.3–14.5)
SODIUM SERPL-SCNC: 137 MMOL/L — SIGNIFICANT CHANGE UP (ref 135–145)
WBC # BLD: 6.65 K/UL — SIGNIFICANT CHANGE UP (ref 3.8–10.5)
WBC # FLD AUTO: 6.65 K/UL — SIGNIFICANT CHANGE UP (ref 3.8–10.5)

## 2024-03-08 PROCEDURE — C1874: CPT

## 2024-03-08 PROCEDURE — C1725: CPT

## 2024-03-08 PROCEDURE — 36415 COLL VENOUS BLD VENIPUNCTURE: CPT

## 2024-03-08 PROCEDURE — C1760: CPT

## 2024-03-08 PROCEDURE — 80048 BASIC METABOLIC PNL TOTAL CA: CPT

## 2024-03-08 PROCEDURE — 82550 ASSAY OF CK (CPK): CPT

## 2024-03-08 PROCEDURE — 80061 LIPID PANEL: CPT

## 2024-03-08 PROCEDURE — 85730 THROMBOPLASTIN TIME PARTIAL: CPT

## 2024-03-08 PROCEDURE — 83735 ASSAY OF MAGNESIUM: CPT

## 2024-03-08 PROCEDURE — 85610 PROTHROMBIN TIME: CPT

## 2024-03-08 PROCEDURE — 85025 COMPLETE CBC W/AUTO DIFF WBC: CPT

## 2024-03-08 PROCEDURE — 82565 ASSAY OF CREATININE: CPT

## 2024-03-08 PROCEDURE — 83036 HEMOGLOBIN GLYCOSYLATED A1C: CPT

## 2024-03-08 PROCEDURE — C1894: CPT

## 2024-03-08 PROCEDURE — 80053 COMPREHEN METABOLIC PANEL: CPT

## 2024-03-08 PROCEDURE — 82962 GLUCOSE BLOOD TEST: CPT

## 2024-03-08 PROCEDURE — 99239 HOSP IP/OBS DSCHRG MGMT >30: CPT

## 2024-03-08 PROCEDURE — 85027 COMPLETE CBC AUTOMATED: CPT

## 2024-03-08 PROCEDURE — 93005 ELECTROCARDIOGRAM TRACING: CPT

## 2024-03-08 PROCEDURE — C1887: CPT

## 2024-03-08 PROCEDURE — C1769: CPT

## 2024-03-08 PROCEDURE — C1753: CPT

## 2024-03-08 PROCEDURE — 85347 COAGULATION TIME ACTIVATED: CPT

## 2024-03-08 RX ORDER — CLOPIDOGREL BISULFATE 75 MG/1
1 TABLET, FILM COATED ORAL
Qty: 30 | Refills: 11
Start: 2024-03-08 | End: 2025-03-02

## 2024-03-08 RX ORDER — APIXABAN 2.5 MG/1
1 TABLET, FILM COATED ORAL
Qty: 60 | Refills: 11
Start: 2024-03-08 | End: 2025-03-02

## 2024-03-08 RX ORDER — ASPIRIN/CALCIUM CARB/MAGNESIUM 324 MG
1 TABLET ORAL
Qty: 30 | Refills: 11
Start: 2024-03-08 | End: 2025-03-02

## 2024-03-08 RX ORDER — ASPIRIN/CALCIUM CARB/MAGNESIUM 324 MG
1 TABLET ORAL
Qty: 30 | Refills: 1
Start: 2024-03-08 | End: 2024-05-06

## 2024-03-08 RX ORDER — ASPIRIN/CALCIUM CARB/MAGNESIUM 324 MG
1 TABLET ORAL
Qty: 30 | Refills: 0
Start: 2024-03-08 | End: 2024-04-06

## 2024-03-08 RX ADMIN — CLOPIDOGREL BISULFATE 75 MILLIGRAM(S): 75 TABLET, FILM COATED ORAL at 12:12

## 2024-03-08 RX ADMIN — Medication 50 MILLIGRAM(S): at 05:40

## 2024-03-08 RX ADMIN — Medication 81 MILLIGRAM(S): at 12:12

## 2024-03-08 RX ADMIN — APIXABAN 5 MILLIGRAM(S): 2.5 TABLET, FILM COATED ORAL at 05:40

## 2024-03-08 RX ADMIN — LISINOPRIL 10 MILLIGRAM(S): 2.5 TABLET ORAL at 05:40

## 2024-03-08 NOTE — DISCHARGE NOTE NURSING/CASE MANAGEMENT/SOCIAL WORK - NSDCPEFALRISK_GEN_ALL_CORE
For information on Fall & Injury Prevention, visit: [email or web address] OR  [email or web address] OR  https://www.cdc.gov/steadi/patient.html

## 2024-03-08 NOTE — DISCHARGE NOTE NURSING/CASE MANAGEMENT/SOCIAL WORK - PATIENT PORTAL LINK FT
You can access the FollowMyHealth Patient Portal offered by [facility] by registering at the following website: [email or web address]. By joining [facility]’s FollowMyHealth portal, you will also be able to view your health information using other applications (apps) compatible with our system.

## 2024-03-10 RX ORDER — METFORMIN HYDROCHLORIDE 850 MG/1
1 TABLET ORAL
Qty: 0 | Refills: 0 | DISCHARGE
Start: 2024-03-10

## 2024-03-14 DIAGNOSIS — Z79.02 LONG TERM (CURRENT) USE OF ANTITHROMBOTICS/ANTIPLATELETS: ICD-10-CM

## 2024-03-14 DIAGNOSIS — Z95.5 PRESENCE OF CORONARY ANGIOPLASTY IMPLANT AND GRAFT: ICD-10-CM

## 2024-03-14 DIAGNOSIS — Z11.52 ENCOUNTER FOR SCREENING FOR COVID-19: ICD-10-CM

## 2024-03-14 DIAGNOSIS — I25.84 CORONARY ATHEROSCLEROSIS DUE TO CALCIFIED CORONARY LESION: ICD-10-CM

## 2024-03-14 DIAGNOSIS — Z79.84 LONG TERM (CURRENT) USE OF ORAL HYPOGLYCEMIC DRUGS: ICD-10-CM

## 2024-03-14 DIAGNOSIS — E11.9 TYPE 2 DIABETES MELLITUS WITHOUT COMPLICATIONS: ICD-10-CM

## 2024-03-14 DIAGNOSIS — E78.5 HYPERLIPIDEMIA, UNSPECIFIED: ICD-10-CM

## 2024-03-14 DIAGNOSIS — I25.10 ATHEROSCLEROTIC HEART DISEASE OF NATIVE CORONARY ARTERY WITHOUT ANGINA PECTORIS: ICD-10-CM

## 2024-03-14 DIAGNOSIS — Z79.01 LONG TERM (CURRENT) USE OF ANTICOAGULANTS: ICD-10-CM

## 2024-03-14 DIAGNOSIS — I10 ESSENTIAL (PRIMARY) HYPERTENSION: ICD-10-CM
